# Patient Record
Sex: FEMALE | Race: WHITE | NOT HISPANIC OR LATINO
[De-identification: names, ages, dates, MRNs, and addresses within clinical notes are randomized per-mention and may not be internally consistent; named-entity substitution may affect disease eponyms.]

---

## 2018-06-18 ENCOUNTER — APPOINTMENT (OUTPATIENT)
Dept: HEMATOLOGY ONCOLOGY | Facility: CLINIC | Age: 73
End: 2018-06-18

## 2018-06-18 ENCOUNTER — LABORATORY RESULT (OUTPATIENT)
Age: 73
End: 2018-06-18

## 2018-06-18 VITALS
HEIGHT: 66 IN | BODY MASS INDEX: 27.32 KG/M2 | RESPIRATION RATE: 15 BRPM | SYSTOLIC BLOOD PRESSURE: 153 MMHG | WEIGHT: 170 LBS | TEMPERATURE: 97.8 F | DIASTOLIC BLOOD PRESSURE: 79 MMHG | HEART RATE: 76 BPM

## 2018-06-18 DIAGNOSIS — Z86.39 PERSONAL HISTORY OF OTHER ENDOCRINE, NUTRITIONAL AND METABOLIC DISEASE: ICD-10-CM

## 2018-06-18 LAB
HCT VFR BLD CALC: 35.7 %
HGB BLD-MCNC: 12.2 G/DL
MCHC RBC-ENTMCNC: 29.5 PG
MCHC RBC-ENTMCNC: 34.2 G/DL
MCV RBC AUTO: 86.4 FL
PLATELET # BLD AUTO: 21 K/UL
PMV BLD: 12.4 FL
RBC # BLD: 4.13 M/UL
RBC # FLD: 12.6 %
WBC # FLD AUTO: 8.95 K/UL

## 2018-06-19 ENCOUNTER — RX RENEWAL (OUTPATIENT)
Age: 73
End: 2018-06-19

## 2018-06-20 LAB
ALBUMIN MFR SERPL ELPH: 66 %
ALBUMIN SERPL ELPH-MCNC: 4.7 G/DL
ALBUMIN SERPL-MCNC: 4.6 G/DL
ALBUMIN/GLOB SERPL: 1.9 RATIO
ALP BLD-CCNC: 77 U/L
ALPHA1 GLOB MFR SERPL ELPH: 3.2 %
ALPHA1 GLOB SERPL ELPH-MCNC: 0.2 G/DL
ALPHA2 GLOB MFR SERPL ELPH: 9.6 %
ALPHA2 GLOB SERPL ELPH-MCNC: 0.7 G/DL
ALT SERPL-CCNC: 12 U/L
ANION GAP SERPL CALC-SCNC: 15 MMOL/L
AST SERPL-CCNC: 18 U/L
B-GLOBULIN MFR SERPL ELPH: 9.2 %
B-GLOBULIN SERPL ELPH-MCNC: 0.6 G/DL
BILIRUB SERPL-MCNC: 0.6 MG/DL
BUN SERPL-MCNC: 24 MG/DL
CALCIUM SERPL-MCNC: 9.3 MG/DL
CARDIOLIPIN AB SER IA-ACNC: NEGATIVE
CHLORIDE SERPL-SCNC: 104 MMOL/L
CO2 SERPL-SCNC: 23 MMOL/L
CREAT SERPL-MCNC: 1.1 MG/DL
DEPRECATED KAPPA LC FREE/LAMBDA SER: 0.99 RATIO
GAMMA GLOB FLD ELPH-MCNC: 0.8 G/DL
GAMMA GLOB MFR SERPL ELPH: 12 %
GLUCOSE SERPL-MCNC: 112 MG/DL
HIV1+2 AB SPEC QL IA.RAPID: NONREACTIVE
INTERPRETATION SERPL IEP-IMP: NORMAL
KAPPA LC CSF-MCNC: 1.5 MG/DL
KAPPA LC SERPL-MCNC: 1.48 MG/DL
LDH SERPL-CCNC: 209
POTASSIUM SERPL-SCNC: 4 MMOL/L
PROT SERPL-MCNC: 7 G/DL
PROT SERPL-MCNC: 7 G/DL
SODIUM SERPL-SCNC: 142 MMOL/L
VIT B12 SERPL-MCNC: 276 PG/ML

## 2018-06-26 LAB
ANA SER IF-ACNC: NEGATIVE
PS IGA SER QL: <20 U/ML
PS IGG SER QL: <10 U/ML
PS IGM SER QL: <25 U/ML

## 2018-06-28 ENCOUNTER — APPOINTMENT (OUTPATIENT)
Dept: HEMATOLOGY ONCOLOGY | Facility: CLINIC | Age: 73
End: 2018-06-28

## 2018-06-28 ENCOUNTER — APPOINTMENT (OUTPATIENT)
Dept: INFUSION THERAPY | Facility: CLINIC | Age: 73
End: 2018-06-28

## 2018-06-28 ENCOUNTER — LABORATORY RESULT (OUTPATIENT)
Age: 73
End: 2018-06-28

## 2018-06-28 LAB
HCT VFR BLD CALC: 36.1 %
HGB BLD-MCNC: 12.7 G/DL
MCHC RBC-ENTMCNC: 30.2 PG
MCHC RBC-ENTMCNC: 35.2 G/DL
MCV RBC AUTO: 86 FL
PLATELET # BLD AUTO: 9 K/UL
PMV BLD: 12.3 FL
RBC # BLD: 4.2 M/UL
RBC # FLD: 12.5 %
WBC # FLD AUTO: 6.41 K/UL

## 2018-06-28 RX ORDER — DEXAMETHASONE 0.5 MG/5ML
20 ELIXIR ORAL ONCE
Qty: 0 | Refills: 0 | Status: DISCONTINUED | OUTPATIENT
Start: 2018-06-28 | End: 2018-06-28

## 2018-06-28 RX ORDER — IMMUNE GLOBULIN,GAMMA(IGG) 5 %
70 VIAL (ML) INTRAVENOUS ONCE
Qty: 0 | Refills: 0 | Status: COMPLETED | OUTPATIENT
Start: 2018-06-28 | End: 2018-06-28

## 2018-06-28 RX ORDER — DEXAMETHASONE 0.5 MG/5ML
20 ELIXIR ORAL ONCE
Qty: 0 | Refills: 0 | Status: COMPLETED | OUTPATIENT
Start: 2018-06-28 | End: 2018-06-28

## 2018-06-28 RX ADMIN — Medication 116.67 GRAM(S): at 12:31

## 2018-06-28 RX ADMIN — Medication 20 MILLIGRAM(S): at 17:01

## 2018-06-29 ENCOUNTER — APPOINTMENT (OUTPATIENT)
Dept: INFUSION THERAPY | Facility: CLINIC | Age: 73
End: 2018-06-29

## 2018-06-29 ENCOUNTER — LABORATORY RESULT (OUTPATIENT)
Age: 73
End: 2018-06-29

## 2018-06-29 RX ORDER — DEXAMETHASONE 4 MG/1
4 TABLET ORAL DAILY
Qty: 20 | Refills: 0 | Status: ACTIVE | COMMUNITY
Start: 2018-06-18 | End: 1900-01-01

## 2018-06-29 RX ORDER — DEXAMETHASONE 0.5 MG/5ML
20 ELIXIR ORAL ONCE
Qty: 0 | Refills: 0 | Status: COMPLETED | OUTPATIENT
Start: 2018-06-29 | End: 2018-06-29

## 2018-06-29 RX ORDER — IMMUNE GLOBULIN,GAMMA(IGG) 5 %
70 VIAL (ML) INTRAVENOUS ONCE
Qty: 0 | Refills: 0 | Status: COMPLETED | OUTPATIENT
Start: 2018-06-29 | End: 2018-06-29

## 2018-06-29 RX ADMIN — Medication 116.67 GRAM(S): at 10:08

## 2018-06-29 RX ADMIN — Medication 20 MILLIGRAM(S): at 09:21

## 2018-06-30 ENCOUNTER — OTHER (OUTPATIENT)
Age: 73
End: 2018-06-30

## 2018-06-30 LAB
HCT VFR BLD CALC: 32.5 %
HGB BLD-MCNC: 11.4 G/DL
MCHC RBC-ENTMCNC: 29.8 PG
MCHC RBC-ENTMCNC: 35.1 G/DL
MCV RBC AUTO: 85.1 FL
PLATELET # BLD AUTO: 37 K/UL
PMV BLD: 11.5 FL
RBC # BLD: 3.82 M/UL
RBC # FLD: 12.7 %
WBC # FLD AUTO: 13.81 K/UL

## 2018-07-03 ENCOUNTER — APPOINTMENT (OUTPATIENT)
Dept: HEMATOLOGY ONCOLOGY | Facility: CLINIC | Age: 73
End: 2018-07-03

## 2018-07-03 ENCOUNTER — LABORATORY RESULT (OUTPATIENT)
Age: 73
End: 2018-07-03

## 2018-07-06 ENCOUNTER — LABORATORY RESULT (OUTPATIENT)
Age: 73
End: 2018-07-06

## 2018-07-06 ENCOUNTER — APPOINTMENT (OUTPATIENT)
Dept: HEMATOLOGY ONCOLOGY | Facility: CLINIC | Age: 73
End: 2018-07-06

## 2018-07-08 LAB
HCT VFR BLD CALC: 35.7 %
HCT VFR BLD CALC: 40.3 %
HGB BLD-MCNC: 12.3 G/DL
HGB BLD-MCNC: 13.9 G/DL
MCHC RBC-ENTMCNC: 29.9 PG
MCHC RBC-ENTMCNC: 30.5 PG
MCHC RBC-ENTMCNC: 34.5 G/DL
MCHC RBC-ENTMCNC: 34.5 G/DL
MCV RBC AUTO: 86.7 FL
MCV RBC AUTO: 88.6 FL
PLATELET # BLD AUTO: 170 K/UL
PLATELET # BLD AUTO: 224 K/UL
PMV BLD: 9.2 FL
PMV BLD: 9.5 FL
RBC # BLD: 4.12 M/UL
RBC # BLD: 4.55 M/UL
RBC # FLD: 13 %
RBC # FLD: 13.2 %
WBC # FLD AUTO: 11.77 K/UL
WBC # FLD AUTO: 9.94 K/UL

## 2018-07-12 ENCOUNTER — APPOINTMENT (OUTPATIENT)
Dept: HEMATOLOGY ONCOLOGY | Facility: CLINIC | Age: 73
End: 2018-07-12

## 2018-07-12 ENCOUNTER — LABORATORY RESULT (OUTPATIENT)
Age: 73
End: 2018-07-12

## 2018-07-12 ENCOUNTER — OTHER (OUTPATIENT)
Age: 73
End: 2018-07-12

## 2018-07-12 VITALS
RESPIRATION RATE: 15 BRPM | DIASTOLIC BLOOD PRESSURE: 85 MMHG | BODY MASS INDEX: 27.32 KG/M2 | HEIGHT: 66 IN | TEMPERATURE: 97.4 F | WEIGHT: 170 LBS | SYSTOLIC BLOOD PRESSURE: 150 MMHG | HEART RATE: 75 BPM

## 2018-07-13 LAB
HCT VFR BLD CALC: 36.9 %
HGB BLD-MCNC: 12.6 G/DL
MCHC RBC-ENTMCNC: 30.1 PG
MCHC RBC-ENTMCNC: 34.1 G/DL
MCV RBC AUTO: 88.1 FL
PLATELET # BLD AUTO: 65 K/UL
PMV BLD: 10.8 FL
RBC # BLD: 4.19 M/UL
RBC # FLD: 13.2 %
WBC # FLD AUTO: 9.11 K/UL

## 2018-07-19 ENCOUNTER — LABORATORY RESULT (OUTPATIENT)
Age: 73
End: 2018-07-19

## 2018-07-19 ENCOUNTER — APPOINTMENT (OUTPATIENT)
Dept: HEMATOLOGY ONCOLOGY | Facility: CLINIC | Age: 73
End: 2018-07-19

## 2018-07-20 LAB
ANION GAP SERPL CALC-SCNC: 18 MMOL/L
BUN SERPL-MCNC: 23 MG/DL
CALCIUM SERPL-MCNC: 9.1 MG/DL
CHLORIDE SERPL-SCNC: 103 MMOL/L
CO2 SERPL-SCNC: 20 MMOL/L
CREAT SERPL-MCNC: 1 MG/DL
GLUCOSE SERPL-MCNC: 95 MG/DL
HCT VFR BLD CALC: 35.5 %
HGB BLD-MCNC: 12.3 G/DL
MCHC RBC-ENTMCNC: 30.3 PG
MCHC RBC-ENTMCNC: 34.6 G/DL
MCV RBC AUTO: 87.4 FL
PLATELET # BLD AUTO: 133 K/UL
PMV BLD: 9.4 FL
POTASSIUM SERPL-SCNC: 4 MMOL/L
RBC # BLD: 4.06 M/UL
RBC # FLD: 13.1 %
SODIUM SERPL-SCNC: 141 MMOL/L
WBC # FLD AUTO: 6.97 K/UL

## 2018-07-26 ENCOUNTER — APPOINTMENT (OUTPATIENT)
Dept: HEMATOLOGY ONCOLOGY | Facility: CLINIC | Age: 73
End: 2018-07-26

## 2018-07-26 ENCOUNTER — LABORATORY RESULT (OUTPATIENT)
Age: 73
End: 2018-07-26

## 2018-07-27 LAB
HCT VFR BLD CALC: 37.3 %
HGB BLD-MCNC: 12.9 G/DL
MCHC RBC-ENTMCNC: 30.6 PG
MCHC RBC-ENTMCNC: 34.6 G/DL
MCV RBC AUTO: 88.6 FL
PLATELET # BLD AUTO: 147 K/UL
PMV BLD: 9.1 FL
RBC # BLD: 4.21 M/UL
RBC # FLD: 13.4 %
WBC # FLD AUTO: 8.2 K/UL

## 2018-08-03 ENCOUNTER — LABORATORY RESULT (OUTPATIENT)
Age: 73
End: 2018-08-03

## 2018-08-03 ENCOUNTER — APPOINTMENT (OUTPATIENT)
Dept: HEMATOLOGY ONCOLOGY | Facility: CLINIC | Age: 73
End: 2018-08-03

## 2018-08-06 ENCOUNTER — OTHER (OUTPATIENT)
Age: 73
End: 2018-08-06

## 2018-08-06 LAB
HCT VFR BLD CALC: 40.2 %
HGB BLD-MCNC: 13.5 G/DL
MCHC RBC-ENTMCNC: 30.3 PG
MCHC RBC-ENTMCNC: 33.6 G/DL
MCV RBC AUTO: 90.1 FL
PLATELET # BLD AUTO: 93 K/UL
PMV BLD: 10 FL
RBC # BLD: 4.46 M/UL
RBC # FLD: 13.2 %
WBC # FLD AUTO: 9.31 K/UL

## 2018-08-09 ENCOUNTER — APPOINTMENT (OUTPATIENT)
Dept: HEMATOLOGY ONCOLOGY | Facility: CLINIC | Age: 73
End: 2018-08-09

## 2018-08-09 ENCOUNTER — LABORATORY RESULT (OUTPATIENT)
Age: 73
End: 2018-08-09

## 2018-08-10 LAB
ANION GAP SERPL CALC-SCNC: 21 MMOL/L
BUN SERPL-MCNC: 23 MG/DL
CALCIUM SERPL-MCNC: 9.5 MG/DL
CHLORIDE SERPL-SCNC: 102 MMOL/L
CO2 SERPL-SCNC: 19 MMOL/L
CREAT SERPL-MCNC: 1.2 MG/DL
GLUCOSE SERPL-MCNC: 91 MG/DL
HCT VFR BLD CALC: 39.4 %
HGB BLD-MCNC: 13.5 G/DL
MCHC RBC-ENTMCNC: 30.5 PG
MCHC RBC-ENTMCNC: 34.3 G/DL
MCV RBC AUTO: 89.1 FL
PLATELET # BLD AUTO: 127 K/UL
PMV BLD: 9.3 FL
POTASSIUM SERPL-SCNC: 3.8 MMOL/L
RBC # BLD: 4.42 M/UL
RBC # FLD: 13.3 %
SODIUM SERPL-SCNC: 142 MMOL/L
WBC # FLD AUTO: 10.59 K/UL

## 2018-08-16 ENCOUNTER — RECORD ABSTRACTING (OUTPATIENT)
Age: 73
End: 2018-08-16

## 2018-08-16 DIAGNOSIS — Z98.890 OTHER SPECIFIED POSTPROCEDURAL STATES: ICD-10-CM

## 2018-08-16 DIAGNOSIS — Z87.19 PERSONAL HISTORY OF OTHER DISEASES OF THE DIGESTIVE SYSTEM: ICD-10-CM

## 2018-08-16 DIAGNOSIS — Z87.898 PERSONAL HISTORY OF OTHER SPECIFIED CONDITIONS: ICD-10-CM

## 2018-08-16 RX ORDER — LEVOTHYROXINE SODIUM 100 UG/1
100 TABLET ORAL
Refills: 0 | Status: ACTIVE | COMMUNITY

## 2018-08-20 ENCOUNTER — APPOINTMENT (OUTPATIENT)
Dept: OBGYN | Facility: CLINIC | Age: 73
End: 2018-08-20

## 2018-08-20 VITALS
BODY MASS INDEX: 25.88 KG/M2 | SYSTOLIC BLOOD PRESSURE: 118 MMHG | DIASTOLIC BLOOD PRESSURE: 70 MMHG | HEIGHT: 66 IN | WEIGHT: 161 LBS

## 2018-08-24 ENCOUNTER — LABORATORY RESULT (OUTPATIENT)
Age: 73
End: 2018-08-24

## 2018-08-24 ENCOUNTER — APPOINTMENT (OUTPATIENT)
Dept: HEMATOLOGY ONCOLOGY | Facility: CLINIC | Age: 73
End: 2018-08-24

## 2018-08-24 LAB
HCT VFR BLD CALC: 37.7 %
HGB BLD-MCNC: 12.9 G/DL
MCHC RBC-ENTMCNC: 30.9 PG
MCHC RBC-ENTMCNC: 34.2 G/DL
MCV RBC AUTO: 90.4 FL
PLATELET # BLD AUTO: 115 K/UL
PMV BLD: 9.4 FL
RBC # BLD: 4.17 M/UL
RBC # FLD: 13.3 %
WBC # FLD AUTO: 7.77 K/UL

## 2018-09-06 ENCOUNTER — LABORATORY RESULT (OUTPATIENT)
Age: 73
End: 2018-09-06

## 2018-09-06 ENCOUNTER — APPOINTMENT (OUTPATIENT)
Dept: HEMATOLOGY ONCOLOGY | Facility: CLINIC | Age: 73
End: 2018-09-06

## 2018-09-06 VITALS
BODY MASS INDEX: 28.28 KG/M2 | TEMPERATURE: 97.1 F | HEART RATE: 75 BPM | WEIGHT: 176 LBS | RESPIRATION RATE: 15 BRPM | HEIGHT: 66 IN | DIASTOLIC BLOOD PRESSURE: 73 MMHG | SYSTOLIC BLOOD PRESSURE: 136 MMHG

## 2018-09-07 LAB
HCT VFR BLD CALC: 37.7 %
HGB BLD-MCNC: 12.9 G/DL
MCHC RBC-ENTMCNC: 30.5 PG
MCHC RBC-ENTMCNC: 34.2 G/DL
MCV RBC AUTO: 89.1 FL
PLATELET # BLD AUTO: 110 K/UL
PMV BLD: 9.5 FL
RBC # BLD: 4.23 M/UL
RBC # FLD: 13.1 %
WBC # FLD AUTO: 9.32 K/UL

## 2018-09-21 ENCOUNTER — LABORATORY RESULT (OUTPATIENT)
Age: 73
End: 2018-09-21

## 2018-09-21 ENCOUNTER — APPOINTMENT (OUTPATIENT)
Dept: HEMATOLOGY ONCOLOGY | Facility: CLINIC | Age: 73
End: 2018-09-21

## 2018-09-21 LAB
ANION GAP SERPL CALC-SCNC: 12 MMOL/L
BUN SERPL-MCNC: 21 MG/DL
CALCIUM SERPL-MCNC: 9.6 MG/DL
CHLORIDE SERPL-SCNC: 102 MMOL/L
CO2 SERPL-SCNC: 26 MMOL/L
CREAT SERPL-MCNC: 1 MG/DL
GLUCOSE SERPL-MCNC: 84 MG/DL
HCT VFR BLD CALC: 38.1 %
HGB BLD-MCNC: 12.8 G/DL
MCHC RBC-ENTMCNC: 30.4 PG
MCHC RBC-ENTMCNC: 33.6 G/DL
MCV RBC AUTO: 90.5 FL
PLATELET # BLD AUTO: 98 K/UL
PMV BLD: 9.3 FL
POTASSIUM SERPL-SCNC: 4.1 MMOL/L
RBC # BLD: 4.21 M/UL
RBC # FLD: 12.8 %
SODIUM SERPL-SCNC: 140 MMOL/L
WBC # FLD AUTO: 8.96 K/UL

## 2018-10-05 ENCOUNTER — LABORATORY RESULT (OUTPATIENT)
Age: 73
End: 2018-10-05

## 2018-10-05 ENCOUNTER — APPOINTMENT (OUTPATIENT)
Dept: HEMATOLOGY ONCOLOGY | Facility: CLINIC | Age: 73
End: 2018-10-05

## 2018-10-05 LAB
HCT VFR BLD CALC: 36.8 %
HGB BLD-MCNC: 12.5 G/DL
MCHC RBC-ENTMCNC: 30.7 PG
MCHC RBC-ENTMCNC: 34 G/DL
MCV RBC AUTO: 90.4 FL
PLATELET # BLD AUTO: 130 K/UL
PMV BLD: 10.2 FL
RBC # BLD: 4.07 M/UL
RBC # FLD: 12.6 %
WBC # FLD AUTO: 7.43 K/UL

## 2018-10-09 ENCOUNTER — RX RENEWAL (OUTPATIENT)
Age: 73
End: 2018-10-09

## 2018-10-09 RX ORDER — LANSOPRAZOLE 30 MG/1
30 CAPSULE, DELAYED RELEASE ORAL DAILY
Qty: 30 | Refills: 1 | Status: ACTIVE | COMMUNITY
Start: 2018-06-19 | End: 1900-01-01

## 2018-10-10 ENCOUNTER — OTHER (OUTPATIENT)
Age: 73
End: 2018-10-10

## 2018-11-08 ENCOUNTER — LABORATORY RESULT (OUTPATIENT)
Age: 73
End: 2018-11-08

## 2018-11-08 ENCOUNTER — APPOINTMENT (OUTPATIENT)
Dept: HEMATOLOGY ONCOLOGY | Facility: CLINIC | Age: 73
End: 2018-11-08

## 2018-11-10 LAB
HCT VFR BLD CALC: 36.7 %
HGB BLD-MCNC: 12.7 G/DL
MCHC RBC-ENTMCNC: 30.5 PG
MCHC RBC-ENTMCNC: 34.6 G/DL
MCV RBC AUTO: 88.2 FL
PLATELET # BLD AUTO: 110 K/UL
PMV BLD: 9.9 FL
RBC # BLD: 4.16 M/UL
RBC # FLD: 11.6 %
WBC # FLD AUTO: 6.68 K/UL

## 2018-12-06 ENCOUNTER — LABORATORY RESULT (OUTPATIENT)
Age: 73
End: 2018-12-06

## 2018-12-06 ENCOUNTER — OUTPATIENT (OUTPATIENT)
Dept: OUTPATIENT SERVICES | Facility: HOSPITAL | Age: 73
LOS: 1 days | Discharge: HOME | End: 2018-12-06

## 2018-12-06 ENCOUNTER — APPOINTMENT (OUTPATIENT)
Dept: HEMATOLOGY ONCOLOGY | Facility: CLINIC | Age: 73
End: 2018-12-06

## 2018-12-06 VITALS
RESPIRATION RATE: 15 BRPM | TEMPERATURE: 97.1 F | WEIGHT: 174 LBS | BODY MASS INDEX: 27.97 KG/M2 | HEART RATE: 76 BPM | SYSTOLIC BLOOD PRESSURE: 114 MMHG | DIASTOLIC BLOOD PRESSURE: 83 MMHG | HEIGHT: 66 IN

## 2018-12-06 DIAGNOSIS — D69.6 THROMBOCYTOPENIA, UNSPECIFIED: ICD-10-CM

## 2018-12-06 NOTE — HISTORY OF PRESENT ILLNESS
[de-identified] : 72 year old female referred by Dr Farzad Gray for thrombocytopenia . She was seen in this office 8 years ago for mild thrombocytopenia , work up is unavailable , she was lost for follow up and had uncomplicated gallbladder surgery in 2012, carpal tunnel surgery in November 2017 . platelet count was 174 in JUNE 2017 and 39 in November 2017 prior to carpal tunnel surgery . Most recent platelet count was 19 on June 8 . She has noted mild easy bruising on her chest and back , no mucosal bleeding . no new meds ASA or vitamins, Last colonoscopy 4 years ago for polyps, mammogram in August 2017 , she reports intentional weight loss of 30 lbs with diet . no fever , night sweats, rash , joint pains, he reports slight fatigue recently . \par  [de-identified] : 07/12/2018 : Patient returns for follow up after 2 courses of pulse decadron 20mg X 4. She complained of insomnia on the medication , she had no response to the first cycle and required Iv IG with good response. She denies any abnormal bleeding at this time. \par 09/06/2018 : patient returns for follow up , she is on prednisone 10mg daily and is frustrated with her inability to lose weight with diet and exercise. She is otherwise asymptomatic. \par \par 12/06/2018 Patient returns for follow up , she is currently on prednisone 5 mg , her weight is back to baseline , she continues on dyazide every other day for edema, she denies any abnormal bleeding , .

## 2018-12-06 NOTE — PHYSICAL EXAM
[Normal] : no JVD, no calf tenderness, venous stasis changes, varices [de-identified] : no edema.  [de-identified] : no petechiae or echymosis.

## 2018-12-06 NOTE — ASSESSMENT
[FreeTextEntry1] : 72 year old female with history of hypothyroidism  ( Hashimoto's ?) on replacement , Chronic ITP with no response to pulse decadron ( 20mg ) , Good transient response to IvIG , today platelet is 100 on prednisone 5mg , will repeat in one month and consider to continue slow taper ( to every other day )  . stop PPI and continue on diuretic as needed.

## 2018-12-07 LAB
HCT VFR BLD CALC: 38.9 %
HGB BLD-MCNC: 13.6 G/DL
MCHC RBC-ENTMCNC: 30.4 PG
MCHC RBC-ENTMCNC: 35 G/DL
MCV RBC AUTO: 87 FL
PLATELET # BLD AUTO: 100 K/UL
PMV BLD: 9.9 FL
RBC # BLD: 4.47 M/UL
RBC # FLD: 11.8 %
WBC # FLD AUTO: 9.03 K/UL

## 2019-01-02 ENCOUNTER — RX RENEWAL (OUTPATIENT)
Age: 74
End: 2019-01-02

## 2019-01-03 ENCOUNTER — OUTPATIENT (OUTPATIENT)
Dept: OUTPATIENT SERVICES | Facility: HOSPITAL | Age: 74
LOS: 1 days | Discharge: HOME | End: 2019-01-03

## 2019-01-03 ENCOUNTER — APPOINTMENT (OUTPATIENT)
Dept: HEMATOLOGY ONCOLOGY | Facility: CLINIC | Age: 74
End: 2019-01-03

## 2019-01-03 ENCOUNTER — LABORATORY RESULT (OUTPATIENT)
Age: 74
End: 2019-01-03

## 2019-01-03 VITALS
SYSTOLIC BLOOD PRESSURE: 124 MMHG | RESPIRATION RATE: 15 BRPM | HEIGHT: 66 IN | HEART RATE: 78 BPM | BODY MASS INDEX: 27.32 KG/M2 | DIASTOLIC BLOOD PRESSURE: 89 MMHG | TEMPERATURE: 97.6 F | WEIGHT: 170 LBS

## 2019-01-03 LAB
HCT VFR BLD CALC: 37.6 %
HGB BLD-MCNC: 13.2 G/DL
MCHC RBC-ENTMCNC: 30.8 PG
MCHC RBC-ENTMCNC: 35.1 G/DL
MCV RBC AUTO: 87.6 FL
PLATELET # BLD AUTO: 65 K/UL
PMV BLD: 10.7 FL
RBC # BLD: 4.29 M/UL
RBC # FLD: 12.2 %
WBC # FLD AUTO: 10.04 K/UL

## 2019-01-03 NOTE — ASSESSMENT
[FreeTextEntry1] : 72 year old female with history of hypothyroidism  ( Hashimoto's ?) on replacement , Chronic ITP with no response to pulse decadron ( 20mg ) , Good transient response to IvIG , today platelet is 64, down from 100  on prednisone 5mg every other day , will increase to 10mg daily , repeat CBC in one week.  . .

## 2019-01-03 NOTE — PHYSICAL EXAM
[Normal] : no JVD, no calf tenderness, venous stasis changes, varices [de-identified] : no edema.  [de-identified] : no petechiae or echymosis.

## 2019-01-03 NOTE — HISTORY OF PRESENT ILLNESS
[de-identified] : 72 year old female referred by Dr Farzad Gray for thrombocytopenia . She was seen in this office 8 years ago for mild thrombocytopenia , work up is unavailable , she was lost for follow up and had uncomplicated gallbladder surgery in 2012, carpal tunnel surgery in November 2017 . platelet count was 174 in JUNE 2017 and 39 in November 2017 prior to carpal tunnel surgery . Most recent platelet count was 19 on June 8 . She has noted mild easy bruising on her chest and back , no mucosal bleeding . no new meds ASA or vitamins, Last colonoscopy 4 years ago for polyps, mammogram in August 2017 , she reports intentional weight loss of 30 lbs with diet . no fever , night sweats, rash , joint pains, he reports slight fatigue recently . \par  [de-identified] : 07/12/2018 : Patient returns for follow up after 2 courses of pulse decadron 20mg X 4. She complained of insomnia on the medication , she had no response to the first cycle and required Iv IG with good response. She denies any abnormal bleeding at this time. \par 09/06/2018 : patient returns for follow up , she is on prednisone 10mg daily and is frustrated with her inability to lose weight with diet and exercise. She is otherwise asymptomatic. \par \par 12/06/2018 Patient returns for follow up , she is currently on prednisone 5 mg , her weight is back to baseline , she continues on dyazide every other day for edema, she denies any abnormal bleeding , .\par \par 01/03/2019 Patient returns for follow up , she is on prednisone 5mg every other day , recent blood work showed K of 3.3 . she continues on dyazide every other day for edema . SHe is back to her baseline weight . She had three nosebleeds last month .

## 2019-01-04 DIAGNOSIS — D69.3 IMMUNE THROMBOCYTOPENIC PURPURA: ICD-10-CM

## 2019-05-30 ENCOUNTER — APPOINTMENT (OUTPATIENT)
Dept: HEMATOLOGY ONCOLOGY | Facility: CLINIC | Age: 74
End: 2019-05-30

## 2019-05-30 ENCOUNTER — OTHER (OUTPATIENT)
Age: 74
End: 2019-05-30

## 2019-05-30 ENCOUNTER — LABORATORY RESULT (OUTPATIENT)
Age: 74
End: 2019-05-30

## 2019-05-30 VITALS
TEMPERATURE: 97.1 F | WEIGHT: 180 LBS | HEIGHT: 66 IN | HEART RATE: 86 BPM | DIASTOLIC BLOOD PRESSURE: 80 MMHG | BODY MASS INDEX: 28.93 KG/M2 | RESPIRATION RATE: 15 BRPM | SYSTOLIC BLOOD PRESSURE: 140 MMHG

## 2019-05-30 NOTE — HISTORY OF PRESENT ILLNESS
[de-identified] : 72 year old female referred by Dr Farzad Gray for thrombocytopenia . She was seen in this office 8 years ago for mild thrombocytopenia , work up is unavailable , she was lost for follow up and had uncomplicated gallbladder surgery in 2012, carpal tunnel surgery in November 2017 . platelet count was 174 in JUNE 2017 and 39 in November 2017 prior to carpal tunnel surgery . Most recent platelet count was 19 on June 8 . She has noted mild easy bruising on her chest and back , no mucosal bleeding . no new meds ASA or vitamins, Last colonoscopy 4 years ago for polyps, mammogram in August 2017 , she reports intentional weight loss of 30 lbs with diet . no fever , night sweats, rash , joint pains, he reports slight fatigue recently . \par  [de-identified] : 07/12/2018 : Patient returns for follow up after 2 courses of pulse decadron 20mg X 4. She complained of insomnia on the medication , she had no response to the first cycle and required Iv IG with good response. She denies any abnormal bleeding at this time. \par 09/06/2018 : patient returns for follow up , she is on prednisone 10mg daily and is frustrated with her inability to lose weight with diet and exercise. She is otherwise asymptomatic. \par \par 12/06/2018 Patient returns for follow up , she is currently on prednisone 5 mg , her weight is back to baseline , she continues on dyazide every other day for edema, she denies any abnormal bleeding , .\par \par 01/03/2019 Patient returns for follow up , she is on prednisone 5mg every other day , recent blood work showed K of 3.3 . she continues on dyazide every other day for edema . SHe is back to her baseline weight . She had three nosebleeds last month .\par \par 05/30/2019 Patient returns for follow up , she continues on prednisone 10mg daily and reports weight gain of 10 lbs . she denies any abnormal bleeding . She feels swollen after she stopped the diuretic and started on losartan 50mg for hypertension . She reports occasional orthostatic dizziness.

## 2019-05-30 NOTE — PHYSICAL EXAM
[Normal] : no JVD, no calf tenderness, venous stasis changes, varices [de-identified] : trace edema [de-identified] : no petechiae or echymosis.

## 2019-05-30 NOTE — ASSESSMENT
[FreeTextEntry1] : 72 year old female with history of hypothyroidism  ( Hashimoto's ?) on replacement , Chronic ITP with no response to pulse decadron ( 20mg ) , Good transient response to IvIG , today platelet is 79 , will reduce prednisone to 5 mg . \par Hypertension , well controlled , mild postural dizziness and edema. Will reduce losartan to 25 mg and add HCTZ 12.5 daily . follow up in one month .

## 2019-05-31 LAB
HCT VFR BLD CALC: 36.1 %
HGB BLD-MCNC: 12.3 G/DL
MCHC RBC-ENTMCNC: 31.1 PG
MCHC RBC-ENTMCNC: 34.1 G/DL
MCV RBC AUTO: 91.2 FL
PLATELET # BLD AUTO: 79 K/UL
PMV BLD: 10 FL
RBC # BLD: 3.96 M/UL
RBC # FLD: 12.1 %
WBC # FLD AUTO: 9.83 K/UL

## 2019-06-07 ENCOUNTER — RX RENEWAL (OUTPATIENT)
Age: 74
End: 2019-06-07

## 2019-07-01 ENCOUNTER — LABORATORY RESULT (OUTPATIENT)
Age: 74
End: 2019-07-01

## 2019-07-01 ENCOUNTER — OUTPATIENT (OUTPATIENT)
Dept: OUTPATIENT SERVICES | Facility: HOSPITAL | Age: 74
LOS: 1 days | Discharge: HOME | End: 2019-07-01

## 2019-07-01 ENCOUNTER — APPOINTMENT (OUTPATIENT)
Dept: HEMATOLOGY ONCOLOGY | Facility: CLINIC | Age: 74
End: 2019-07-01
Payer: MEDICARE

## 2019-07-01 VITALS
BODY MASS INDEX: 28.93 KG/M2 | WEIGHT: 180 LBS | TEMPERATURE: 96.4 F | HEIGHT: 66 IN | HEART RATE: 86 BPM | RESPIRATION RATE: 14 BRPM | SYSTOLIC BLOOD PRESSURE: 130 MMHG | DIASTOLIC BLOOD PRESSURE: 61 MMHG

## 2019-07-01 DIAGNOSIS — D69.3 IMMUNE THROMBOCYTOPENIC PURPURA: ICD-10-CM

## 2019-07-01 LAB
ANION GAP SERPL CALC-SCNC: 13 MMOL/L
BUN SERPL-MCNC: 27 MG/DL
CALCIUM SERPL-MCNC: 9.7 MG/DL
CHLORIDE SERPL-SCNC: 106 MMOL/L
CO2 SERPL-SCNC: 24 MMOL/L
CREAT SERPL-MCNC: 1.1 MG/DL
GLUCOSE SERPL-MCNC: 89 MG/DL
HCT VFR BLD CALC: 36.8 %
HGB BLD-MCNC: 12.2 G/DL
MCHC RBC-ENTMCNC: 30.8 PG
MCHC RBC-ENTMCNC: 33.2 G/DL
MCV RBC AUTO: 92.9 FL
PLATELET # BLD AUTO: 88 K/UL
PMV BLD: 9.2 FL
POTASSIUM SERPL-SCNC: 4 MMOL/L
RBC # BLD: 3.96 M/UL
RBC # FLD: 12 %
SODIUM SERPL-SCNC: 143 MMOL/L
WBC # FLD AUTO: 5.18 K/UL

## 2019-07-01 PROCEDURE — 99214 OFFICE O/P EST MOD 30 MIN: CPT

## 2019-07-01 NOTE — PHYSICAL EXAM
[Normal] : no JVD, no calf tenderness, venous stasis changes, varices [Fully active, able to carry on all pre-disease performance without restriction] : Status 0 - Fully active, able to carry on all pre-disease performance without restriction [de-identified] : trace edema [de-identified] : scattered bruises on extremities, healing well

## 2019-07-01 NOTE — ASSESSMENT
[FreeTextEntry1] : 72 year old female with history of hypothyroidism  ( Hashimoto's ?) on replacement , Chronic ITP with no response to pulse decadron ( 20mg ) , Good transient response to IvIG , today platelet is 88 , will change to prednisone to 5 mg every other day alternating with 2.5 mg.\par  \par #Hypertension , well controlled , mild postural dizziness and edema, now improved. C/W losartan to 25 mg and add Triamterene-HCTZ 12.5 daily . \par \par #HCP: She will f/u with gyn who orders screening mammogram. \par \par #RTC in 2 months

## 2019-07-01 NOTE — HISTORY OF PRESENT ILLNESS
[de-identified] : 72 year old female referred by Dr Farzad Gray for thrombocytopenia . She was seen in this office 8 years ago for mild thrombocytopenia , work up is unavailable , she was lost for follow up and had uncomplicated gallbladder surgery in 2012, carpal tunnel surgery in November 2017 . platelet count was 174 in JUNE 2017 and 39 in November 2017 prior to carpal tunnel surgery . Most recent platelet count was 19 on June 8 . She has noted mild easy bruising on her chest and back , no mucosal bleeding . no new meds ASA or vitamins, Last colonoscopy 4 years ago for polyps, mammogram in August 2017 , she reports intentional weight loss of 30 lbs with diet . no fever , night sweats, rash , joint pains, he reports slight fatigue recently . \par  [de-identified] : 07/12/2018 : Patient returns for follow up after 2 courses of pulse decadron 20mg X 4. She complained of insomnia on the medication , she had no response to the first cycle and required Iv IG with good response. She denies any abnormal bleeding at this time. \par 09/06/2018 : patient returns for follow up , she is on prednisone 10mg daily and is frustrated with her inability to lose weight with diet and exercise. She is otherwise asymptomatic. \par \par 12/06/2018 Patient returns for follow up , she is currently on prednisone 5 mg , her weight is back to baseline , she continues on dyazide every other day for edema, she denies any abnormal bleeding , .\par \par 01/03/2019 Patient returns for follow up , she is on prednisone 5mg every other day , recent blood work showed K of 3.3 . she continues on dyazide every other day for edema . SHe is back to her baseline weight . She had three nosebleeds last month .\par \par 05/30/2019 Patient returns for follow up , she continues on prednisone 10mg daily and reports weight gain of 10 lbs . she denies any abnormal bleeding . She feels swollen after she stopped the diuretic and started on losartan 50mg for hypertension . She reports occasional orthostatic dizziness. \par \par 7/1/19: Pt returns for a follow-up visit. She only c/o a few small bruises on her extremities that are in different stages of healing. She denies having any trauma. No petechiae, intraoral bleeding or bleeding from any other site. Her platelet count today is 88. Rest of the CBC is wnl. She is presently on prednisone 5mg PO daily.

## 2019-07-05 DIAGNOSIS — D69.6 THROMBOCYTOPENIA, UNSPECIFIED: ICD-10-CM

## 2019-07-24 ENCOUNTER — OTHER (OUTPATIENT)
Age: 74
End: 2019-07-24

## 2019-08-29 ENCOUNTER — RX RENEWAL (OUTPATIENT)
Age: 74
End: 2019-08-29

## 2019-09-10 ENCOUNTER — LABORATORY RESULT (OUTPATIENT)
Age: 74
End: 2019-09-10

## 2019-09-10 ENCOUNTER — APPOINTMENT (OUTPATIENT)
Dept: HEMATOLOGY ONCOLOGY | Facility: CLINIC | Age: 74
End: 2019-09-10
Payer: MEDICARE

## 2019-09-10 VITALS
TEMPERATURE: 97.4 F | HEIGHT: 67 IN | WEIGHT: 174 LBS | BODY MASS INDEX: 27.31 KG/M2 | HEART RATE: 77 BPM | DIASTOLIC BLOOD PRESSURE: 82 MMHG | SYSTOLIC BLOOD PRESSURE: 190 MMHG | RESPIRATION RATE: 14 BRPM

## 2019-09-10 VITALS — DIASTOLIC BLOOD PRESSURE: 82 MMHG | SYSTOLIC BLOOD PRESSURE: 150 MMHG

## 2019-09-10 DIAGNOSIS — Z00.00 ENCOUNTER FOR GENERAL ADULT MEDICAL EXAMINATION W/OUT ABNORMAL FINDINGS: ICD-10-CM

## 2019-09-10 PROCEDURE — 99214 OFFICE O/P EST MOD 30 MIN: CPT

## 2019-09-10 NOTE — PHYSICAL EXAM
[Normal] : no JVD, no calf tenderness, venous stasis changes, varices [de-identified] : trace edema [de-identified] : multiple bruises on lower extremities and large echymosis on right shoulder .

## 2019-09-10 NOTE — PHYSICAL EXAM
[Normal] : no JVD, no calf tenderness, venous stasis changes, varices [de-identified] : trace edema [de-identified] : multiple bruises on lower extremities and large echymosis on right shoulder .

## 2019-09-11 LAB
HCT VFR BLD CALC: 35.3 %
HGB BLD-MCNC: 12.4 G/DL
MCHC RBC-ENTMCNC: 31.4 PG
MCHC RBC-ENTMCNC: 35.1 G/DL
MCV RBC AUTO: 89.4 FL
PLATELET # BLD AUTO: 45 K/UL
PMV BLD: 11.9 FL
RBC # BLD: 3.95 M/UL
RBC # FLD: 12.1 %
WBC # FLD AUTO: 8.97 K/UL

## 2019-09-11 NOTE — HISTORY OF PRESENT ILLNESS
[de-identified] : 72 year old female referred by Dr Farzad Gray for thrombocytopenia . She was seen in this office 8 years ago for mild thrombocytopenia , work up is unavailable , she was lost for follow up and had uncomplicated gallbladder surgery in 2012, carpal tunnel surgery in November 2017 . platelet count was 174 in JUNE 2017 and 39 in November 2017 prior to carpal tunnel surgery . Most recent platelet count was 19 on June 8 . She has noted mild easy bruising on her chest and back , no mucosal bleeding . no new meds ASA or vitamins, Last colonoscopy 4 years ago for polyps, mammogram in August 2017 , she reports intentional weight loss of 30 lbs with diet . no fever , night sweats, rash , joint pains, he reports slight fatigue recently . \par  [de-identified] : 07/12/2018 : Patient returns for follow up after 2 courses of pulse decadron 20mg X 4. She complained of insomnia on the medication , she had no response to the first cycle and required Iv IG with good response. She denies any abnormal bleeding at this time. \par 09/06/2018 : patient returns for follow up , she is on prednisone 10mg daily and is frustrated with her inability to lose weight with diet and exercise. She is otherwise asymptomatic. \par \par 12/06/2018 Patient returns for follow up , she is currently on prednisone 5 mg , her weight is back to baseline , she continues on dyazide every other day for edema, she denies any abnormal bleeding , .\par \par 01/03/2019 Patient returns for follow up , she is on prednisone 5mg every other day , recent blood work showed K of 3.3 . she continues on dyazide every other day for edema . SHe is back to her baseline weight . She had three nosebleeds last month .\par \par 05/30/2019 Patient returns for follow up , she continues on prednisone 10mg daily and reports weight gain of 10 lbs . she denies any abnormal bleeding . She feels swollen after she stopped the diuretic and started on losartan 50mg for hypertension . She reports occasional orthostatic dizziness.

## 2019-09-11 NOTE — HISTORY OF PRESENT ILLNESS
[de-identified] : 72 year old female referred by Dr Farzad Gray for thrombocytopenia . She was seen in this office 8 years ago for mild thrombocytopenia , work up is unavailable , she was lost for follow up and had uncomplicated gallbladder surgery in 2012, carpal tunnel surgery in November 2017 . platelet count was 174 in JUNE 2017 and 39 in November 2017 prior to carpal tunnel surgery . Most recent platelet count was 19 on June 8 . She has noted mild easy bruising on her chest and back , no mucosal bleeding . no new meds ASA or vitamins, Last colonoscopy 4 years ago for polyps, mammogram in August 2017 , she reports intentional weight loss of 30 lbs with diet . no fever , night sweats, rash , joint pains, he reports slight fatigue recently . \par  [de-identified] : 07/12/2018 : Patient returns for follow up after 2 courses of pulse decadron 20mg X 4. She complained of insomnia on the medication , she had no response to the first cycle and required Iv IG with good response. She denies any abnormal bleeding at this time. \par 09/06/2018 : patient returns for follow up , she is on prednisone 10mg daily and is frustrated with her inability to lose weight with diet and exercise. She is otherwise asymptomatic. \par \par 12/06/2018 Patient returns for follow up , she is currently on prednisone 5 mg , her weight is back to baseline , she continues on dyazide every other day for edema, she denies any abnormal bleeding , .\par \par 01/03/2019 Patient returns for follow up , she is on prednisone 5mg every other day , recent blood work showed K of 3.3 . she continues on dyazide every other day for edema . SHe is back to her baseline weight . She had three nosebleeds last month .\par \par 05/30/2019 Patient returns for follow up , she continues on prednisone 10mg daily and reports weight gain of 10 lbs . she denies any abnormal bleeding . She feels swollen after she stopped the diuretic and started on losartan 50mg for hypertension . She reports occasional orthostatic dizziness.

## 2019-09-24 ENCOUNTER — APPOINTMENT (OUTPATIENT)
Dept: HEMATOLOGY ONCOLOGY | Facility: CLINIC | Age: 74
End: 2019-09-24
Payer: MEDICARE

## 2019-09-24 ENCOUNTER — LABORATORY RESULT (OUTPATIENT)
Age: 74
End: 2019-09-24

## 2019-09-24 VITALS
BODY MASS INDEX: 27.94 KG/M2 | RESPIRATION RATE: 16 BRPM | HEART RATE: 82 BPM | TEMPERATURE: 97.5 F | HEIGHT: 67 IN | WEIGHT: 178 LBS | DIASTOLIC BLOOD PRESSURE: 78 MMHG | SYSTOLIC BLOOD PRESSURE: 169 MMHG

## 2019-09-24 LAB
HCT VFR BLD CALC: 38 %
HGB BLD-MCNC: 13.2 G/DL
MCHC RBC-ENTMCNC: 31 PG
MCHC RBC-ENTMCNC: 34.7 G/DL
MCV RBC AUTO: 89.2 FL
PLATELET # BLD AUTO: 41 K/UL
PMV BLD: 11.4 FL
RBC # BLD: 4.26 M/UL
RBC # FLD: 12.5 %
WBC # FLD AUTO: 8.31 K/UL

## 2019-09-24 PROCEDURE — 99214 OFFICE O/P EST MOD 30 MIN: CPT

## 2019-09-25 LAB
ALBUMIN SERPL ELPH-MCNC: 4.9 G/DL
ALP BLD-CCNC: 74 U/L
ALT SERPL-CCNC: 15 U/L
ANION GAP SERPL CALC-SCNC: 14 MMOL/L
AST SERPL-CCNC: 21 U/L
BILIRUB SERPL-MCNC: 0.7 MG/DL
BUN SERPL-MCNC: 35 MG/DL
CALCIUM SERPL-MCNC: 10 MG/DL
CHLORIDE SERPL-SCNC: 105 MMOL/L
CO2 SERPL-SCNC: 23 MMOL/L
CREAT SERPL-MCNC: 1.3 MG/DL
GLUCOSE SERPL-MCNC: 93 MG/DL
HBV CORE IGG+IGM SER QL: NONREACTIVE
HBV CORE IGM SER QL: NONREACTIVE
HBV SURFACE AB SER QL: REACTIVE
HBV SURFACE AG SER QL: NONREACTIVE
HCV AB SER QL: NONREACTIVE
HCV S/CO RATIO: 0.11 S/CO
POTASSIUM SERPL-SCNC: 3.8 MMOL/L
PROT SERPL-MCNC: 7.2 G/DL
SODIUM SERPL-SCNC: 142 MMOL/L

## 2019-09-25 NOTE — ASSESSMENT
[FreeTextEntry1] : 73 year old female with history of hypothyroidism  ( Hashimoto's ?) on replacement , Chronic ITP \par Platelet : 41 , symptomatic, on 5 mg prednisone Q24H. Patient is requesting to discontinue steroids. \par \par We discussed at length alternative options , pros , cons, risk and benefits of splenectomy , immunotherapy , rituximab and thrombopoietic agents like promacta. She opted to start rituxan\par \par Plan:\par --Check Hep B and C serologies\par --She will start weekly rituxan x 4 on 10/1/19\par --C/W current doses of prednisone \par

## 2019-09-25 NOTE — HISTORY OF PRESENT ILLNESS
[de-identified] : 72 year old female referred by Dr Farzad Gray for thrombocytopenia . She was seen in this office 8 years ago for mild thrombocytopenia , work up is unavailable , she was lost for follow up and had uncomplicated gallbladder surgery in 2012, carpal tunnel surgery in November 2017 . platelet count was 174 in JUNE 2017 and 39 in November 2017 prior to carpal tunnel surgery . Most recent platelet count was 19 on June 8 . She has noted mild easy bruising on her chest and back , no mucosal bleeding . no new meds ASA or vitamins, Last colonoscopy 4 years ago for polyps, mammogram in August 2017 , she reports intentional weight loss of 30 lbs with diet . no fever , night sweats, rash , joint pains, he reports slight fatigue recently . \par  [de-identified] : 07/12/2018 : Patient returns for follow up after 2 courses of pulse decadron 20mg X 4. She complained of insomnia on the medication , she had no response to the first cycle and required Iv IG with good response. She denies any abnormal bleeding at this time. \par 09/06/2018 : patient returns for follow up , she is on prednisone 10mg daily and is frustrated with her inability to lose weight with diet and exercise. She is otherwise asymptomatic. \par \par 12/06/2018 Patient returns for follow up , she is currently on prednisone 5 mg , her weight is back to baseline , she continues on dyazide every other day for edema, she denies any abnormal bleeding , .\par \par 01/03/2019 Patient returns for follow up , she is on prednisone 5mg every other day , recent blood work showed K of 3.3 . she continues on dyazide every other day for edema . SHe is back to her baseline weight . She had three nosebleeds last month .\par \par 05/30/2019 Patient returns for follow up , she continues on prednisone 10mg daily and reports weight gain of 10 lbs . she denies any abnormal bleeding . She feels swollen after she stopped the diuretic and started on losartan 50mg for hypertension . She reports occasional orthostatic dizziness. \par \par 09/10/2019 Patient returns for follow up , she is on prednisone 5 mg alternating with 2.5 mg daily . she complains of generalized bruising including large echymosis on right shoulder , noted after colonoscopy and is possibly traumatic . platelet :45 . She has been on steroids for nearly 18 months and is " fed up " from the side effects including mood and weight fluctuations , hypertension . . \par \par 9/24/19: Pt returns for a f/u visit. She has no fresh complaints. Her previous shoulder bruise has resolved. She is currently on 5 mg PO prednisone daily. Her plts are 41,000 today.

## 2019-09-25 NOTE — PHYSICAL EXAM
[Fully active, able to carry on all pre-disease performance without restriction] : Status 0 - Fully active, able to carry on all pre-disease performance without restriction [Normal] : no JVD, no calf tenderness, venous stasis changes, varices [de-identified] : trace edema

## 2019-10-01 ENCOUNTER — LABORATORY RESULT (OUTPATIENT)
Age: 74
End: 2019-10-01

## 2019-10-01 ENCOUNTER — APPOINTMENT (OUTPATIENT)
Dept: INFUSION THERAPY | Facility: CLINIC | Age: 74
End: 2019-10-01

## 2019-10-01 LAB
HCT VFR BLD CALC: 37.8 %
HGB BLD-MCNC: 12.8 G/DL
MCHC RBC-ENTMCNC: 30.5 PG
MCHC RBC-ENTMCNC: 33.9 G/DL
MCV RBC AUTO: 90 FL
PLATELET # BLD AUTO: 43 K/UL
PMV BLD: 11.6 FL
RBC # BLD: 4.2 M/UL
RBC # FLD: 12.5 %
WBC # FLD AUTO: 7.11 K/UL

## 2019-10-01 RX ORDER — RITUXIMAB 10 MG/ML
700 INJECTION, SOLUTION INTRAVENOUS ONCE
Refills: 0 | Status: COMPLETED | OUTPATIENT
Start: 2019-10-01 | End: 2019-10-01

## 2019-10-01 RX ORDER — ACETAMINOPHEN 500 MG
650 TABLET ORAL ONCE
Refills: 0 | Status: COMPLETED | OUTPATIENT
Start: 2019-10-01 | End: 2019-10-01

## 2019-10-01 RX ORDER — DIPHENHYDRAMINE HCL 50 MG
50 CAPSULE ORAL ONCE
Refills: 0 | Status: COMPLETED | OUTPATIENT
Start: 2019-10-01 | End: 2019-10-01

## 2019-10-01 RX ORDER — DEXAMETHASONE 0.5 MG/5ML
10 ELIXIR ORAL ONCE
Refills: 0 | Status: COMPLETED | OUTPATIENT
Start: 2019-10-01 | End: 2019-10-01

## 2019-10-01 RX ADMIN — RITUXIMAB 175 MILLIGRAM(S): 10 INJECTION, SOLUTION INTRAVENOUS at 10:56

## 2019-10-01 RX ADMIN — Medication 50 MILLIGRAM(S): at 10:44

## 2019-10-01 RX ADMIN — Medication 650 MILLIGRAM(S): at 10:04

## 2019-10-01 RX ADMIN — Medication 650 MILLIGRAM(S): at 10:03

## 2019-10-01 RX ADMIN — Medication 10 MILLIGRAM(S): at 10:23

## 2019-10-01 RX ADMIN — Medication 102 MILLIGRAM(S): at 10:24

## 2019-10-01 RX ADMIN — Medication 102 MILLIGRAM(S): at 10:03

## 2019-10-08 ENCOUNTER — LABORATORY RESULT (OUTPATIENT)
Age: 74
End: 2019-10-08

## 2019-10-08 ENCOUNTER — APPOINTMENT (OUTPATIENT)
Dept: INFUSION THERAPY | Facility: CLINIC | Age: 74
End: 2019-10-08

## 2019-10-08 LAB
HCT VFR BLD CALC: 37.9 %
HGB BLD-MCNC: 12.9 G/DL
MCHC RBC-ENTMCNC: 30.6 PG
MCHC RBC-ENTMCNC: 34 G/DL
MCV RBC AUTO: 90 FL
PLATELET # BLD AUTO: 62 K/UL
PMV BLD: 10.8 FL
RBC # BLD: 4.21 M/UL
RBC # FLD: 12.4 %
WBC # FLD AUTO: 7.48 K/UL

## 2019-10-08 RX ORDER — RITUXIMAB 10 MG/ML
700 INJECTION, SOLUTION INTRAVENOUS ONCE
Refills: 0 | Status: COMPLETED | OUTPATIENT
Start: 2019-10-08 | End: 2019-10-08

## 2019-10-08 RX ORDER — ACETAMINOPHEN 500 MG
650 TABLET ORAL ONCE
Refills: 0 | Status: COMPLETED | OUTPATIENT
Start: 2019-10-08 | End: 2019-10-08

## 2019-10-08 RX ORDER — DIPHENHYDRAMINE HCL 50 MG
50 CAPSULE ORAL ONCE
Refills: 0 | Status: COMPLETED | OUTPATIENT
Start: 2019-10-08 | End: 2019-10-08

## 2019-10-08 RX ORDER — DEXAMETHASONE 0.5 MG/5ML
10 ELIXIR ORAL ONCE
Refills: 0 | Status: COMPLETED | OUTPATIENT
Start: 2019-10-08 | End: 2019-10-08

## 2019-10-08 RX ADMIN — Medication 102 MILLIGRAM(S): at 09:38

## 2019-10-08 RX ADMIN — Medication 50 MILLIGRAM(S): at 09:58

## 2019-10-08 RX ADMIN — Medication 650 MILLIGRAM(S): at 09:18

## 2019-10-08 RX ADMIN — Medication 102 MILLIGRAM(S): at 09:17

## 2019-10-08 RX ADMIN — RITUXIMAB 175 MILLIGRAM(S): 10 INJECTION, SOLUTION INTRAVENOUS at 09:47

## 2019-10-08 RX ADMIN — Medication 10 MILLIGRAM(S): at 09:38

## 2019-10-15 ENCOUNTER — LABORATORY RESULT (OUTPATIENT)
Age: 74
End: 2019-10-15

## 2019-10-15 ENCOUNTER — APPOINTMENT (OUTPATIENT)
Dept: INFUSION THERAPY | Facility: CLINIC | Age: 74
End: 2019-10-15

## 2019-10-15 LAB
HCT VFR BLD CALC: 36.5 %
HGB BLD-MCNC: 12.7 G/DL
MCHC RBC-ENTMCNC: 31.3 PG
MCHC RBC-ENTMCNC: 34.8 G/DL
MCV RBC AUTO: 89.9 FL
PLATELET # BLD AUTO: 29 K/UL
PMV BLD: 11 FL
RBC # BLD: 4.06 M/UL
RBC # FLD: 12.4 %
WBC # FLD AUTO: 7.51 K/UL

## 2019-10-15 RX ORDER — DEXAMETHASONE 0.5 MG/5ML
10 ELIXIR ORAL ONCE
Refills: 0 | Status: COMPLETED | OUTPATIENT
Start: 2019-10-15 | End: 2019-10-15

## 2019-10-15 RX ORDER — DIPHENHYDRAMINE HCL 50 MG
50 CAPSULE ORAL ONCE
Refills: 0 | Status: COMPLETED | OUTPATIENT
Start: 2019-10-15 | End: 2019-10-15

## 2019-10-15 RX ORDER — RITUXIMAB 10 MG/ML
700 INJECTION, SOLUTION INTRAVENOUS ONCE
Refills: 0 | Status: COMPLETED | OUTPATIENT
Start: 2019-10-15 | End: 2019-10-15

## 2019-10-15 RX ORDER — ACETAMINOPHEN 500 MG
650 TABLET ORAL ONCE
Refills: 0 | Status: COMPLETED | OUTPATIENT
Start: 2019-10-15 | End: 2019-10-15

## 2019-10-15 RX ADMIN — RITUXIMAB 175 MILLIGRAM(S): 10 INJECTION, SOLUTION INTRAVENOUS at 10:00

## 2019-10-15 RX ADMIN — Medication 650 MILLIGRAM(S): at 09:09

## 2019-10-15 RX ADMIN — Medication 10 MILLIGRAM(S): at 10:00

## 2019-10-15 RX ADMIN — Medication 102 MILLIGRAM(S): at 09:45

## 2019-10-15 RX ADMIN — Medication 650 MILLIGRAM(S): at 09:08

## 2019-10-15 RX ADMIN — RITUXIMAB 700 MILLIGRAM(S): 10 INJECTION, SOLUTION INTRAVENOUS at 12:30

## 2019-10-15 RX ADMIN — Medication 50 MILLIGRAM(S): at 09:40

## 2019-10-15 RX ADMIN — Medication 102 MILLIGRAM(S): at 09:08

## 2019-10-22 ENCOUNTER — APPOINTMENT (OUTPATIENT)
Dept: INFUSION THERAPY | Facility: CLINIC | Age: 74
End: 2019-10-22

## 2019-10-22 ENCOUNTER — LABORATORY RESULT (OUTPATIENT)
Age: 74
End: 2019-10-22

## 2019-10-22 LAB
HCT VFR BLD CALC: 37.2 %
HGB BLD-MCNC: 12.8 G/DL
MCHC RBC-ENTMCNC: 30.7 PG
MCHC RBC-ENTMCNC: 34.4 G/DL
MCV RBC AUTO: 89.2 FL
PLATELET # BLD AUTO: 41 K/UL
PMV BLD: 10.6 FL
RBC # BLD: 4.17 M/UL
RBC # FLD: 12.6 %
WBC # FLD AUTO: 8.05 K/UL

## 2019-10-22 RX ORDER — ACETAMINOPHEN 500 MG
650 TABLET ORAL ONCE
Refills: 0 | Status: COMPLETED | OUTPATIENT
Start: 2019-10-22 | End: 2019-10-22

## 2019-10-22 RX ORDER — RITUXIMAB 10 MG/ML
700 INJECTION, SOLUTION INTRAVENOUS ONCE
Refills: 0 | Status: COMPLETED | OUTPATIENT
Start: 2019-10-22 | End: 2019-10-22

## 2019-10-22 RX ORDER — DEXAMETHASONE 0.5 MG/5ML
10 ELIXIR ORAL ONCE
Refills: 0 | Status: COMPLETED | OUTPATIENT
Start: 2019-10-22 | End: 2019-10-22

## 2019-10-22 RX ORDER — DIPHENHYDRAMINE HCL 50 MG
50 CAPSULE ORAL ONCE
Refills: 0 | Status: COMPLETED | OUTPATIENT
Start: 2019-10-22 | End: 2019-10-22

## 2019-10-22 RX ADMIN — Medication 50 MILLIGRAM(S): at 12:00

## 2019-10-22 RX ADMIN — Medication 102 MILLIGRAM(S): at 11:20

## 2019-10-22 RX ADMIN — Medication 10 MILLIGRAM(S): at 11:40

## 2019-10-22 RX ADMIN — Medication 102 MILLIGRAM(S): at 11:40

## 2019-10-22 RX ADMIN — Medication 650 MILLIGRAM(S): at 11:21

## 2019-10-22 RX ADMIN — RITUXIMAB 700 MILLIGRAM(S): 10 INJECTION, SOLUTION INTRAVENOUS at 15:00

## 2019-10-22 RX ADMIN — RITUXIMAB 175 MILLIGRAM(S): 10 INJECTION, SOLUTION INTRAVENOUS at 12:09

## 2019-10-28 ENCOUNTER — APPOINTMENT (OUTPATIENT)
Dept: HEMATOLOGY ONCOLOGY | Facility: CLINIC | Age: 74
End: 2019-10-28
Payer: MEDICARE

## 2019-10-28 ENCOUNTER — LABORATORY RESULT (OUTPATIENT)
Age: 74
End: 2019-10-28

## 2019-10-28 VITALS
BODY MASS INDEX: 27.78 KG/M2 | HEART RATE: 88 BPM | DIASTOLIC BLOOD PRESSURE: 58 MMHG | TEMPERATURE: 97.4 F | HEIGHT: 67 IN | SYSTOLIC BLOOD PRESSURE: 122 MMHG | WEIGHT: 177 LBS | RESPIRATION RATE: 16 BRPM

## 2019-10-28 LAB
HCT VFR BLD CALC: 37.6 %
HGB BLD-MCNC: 13 G/DL
MCHC RBC-ENTMCNC: 30.4 PG
MCHC RBC-ENTMCNC: 34.6 G/DL
MCV RBC AUTO: 87.9 FL
PLATELET # BLD AUTO: 46 K/UL
PMV BLD: 11 FL
RBC # BLD: 4.28 M/UL
RBC # FLD: 12.9 %
WBC # FLD AUTO: 10.8 K/UL

## 2019-10-28 PROCEDURE — 99213 OFFICE O/P EST LOW 20 MIN: CPT

## 2019-10-29 ENCOUNTER — APPOINTMENT (OUTPATIENT)
Dept: INFUSION THERAPY | Facility: CLINIC | Age: 74
End: 2019-10-29

## 2019-10-30 NOTE — ASSESSMENT
[FreeTextEntry1] : 73 year old female with history of hypothyroidism  ( Hashimoto's ?) on replacement , Chronic ITP on steroids\par \par Recently started on rituxan, completed 4 doses. Due to drop in plts to 29 on 10/15/9, her prednisone was increased to 10 mg. Plts today is 46. \par \par Plan:\par --C/W same dose of prednisone\par --No more rituxan. Pt was explained that it will take some time to appreciate rituxan effect on ITP\par --RTO in 1 month\par

## 2019-10-30 NOTE — PHYSICAL EXAM
[Fully active, able to carry on all pre-disease performance without restriction] : Status 0 - Fully active, able to carry on all pre-disease performance without restriction [Normal] : normoactive bowel sounds, soft and nontender, no hepatosplenomegaly or masses appreciated [de-identified] : trace edema

## 2019-10-30 NOTE — HISTORY OF PRESENT ILLNESS
[de-identified] : 72 year old female referred by Dr Farzad Gray for thrombocytopenia . She was seen in this office 8 years ago for mild thrombocytopenia , work up is unavailable , she was lost for follow up and had uncomplicated gallbladder surgery in 2012, carpal tunnel surgery in November 2017 . platelet count was 174 in JUNE 2017 and 39 in November 2017 prior to carpal tunnel surgery . Most recent platelet count was 19 on June 8 . She has noted mild easy bruising on her chest and back , no mucosal bleeding . no new meds ASA or vitamins, Last colonoscopy 4 years ago for polyps, mammogram in August 2017 , she reports intentional weight loss of 30 lbs with diet . no fever , night sweats, rash , joint pains, he reports slight fatigue recently . \par  [de-identified] : 07/12/2018 : Patient returns for follow up after 2 courses of pulse decadron 20mg X 4. She complained of insomnia on the medication , she had no response to the first cycle and required Iv IG with good response. She denies any abnormal bleeding at this time. \par 09/06/2018 : patient returns for follow up , she is on prednisone 10mg daily and is frustrated with her inability to lose weight with diet and exercise. She is otherwise asymptomatic. \par \par 12/06/2018 Patient returns for follow up , she is currently on prednisone 5 mg , her weight is back to baseline , she continues on dyazide every other day for edema, she denies any abnormal bleeding , .\par \par 01/03/2019 Patient returns for follow up , she is on prednisone 5mg every other day , recent blood work showed K of 3.3 . she continues on dyazide every other day for edema . SHe is back to her baseline weight . She had three nosebleeds last month .\par \par 05/30/2019 Patient returns for follow up , she continues on prednisone 10mg daily and reports weight gain of 10 lbs . she denies any abnormal bleeding . She feels swollen after she stopped the diuretic and started on losartan 50mg for hypertension . She reports occasional orthostatic dizziness. \par \par 09/10/2019 Patient returns for follow up , she is on prednisone 5 mg alternating with 2.5 mg daily . she complains of generalized bruising including large echymosis on right shoulder , noted after colonoscopy and is possibly traumatic . platelet :45 . She has been on steroids for nearly 18 months and is " fed up " from the side effects including mood and weight fluctuations , hypertension . . \par \par 9/24/19: Pt returns for a f/u visit. She has no fresh complaints. Her previous shoulder bruise has resolved. She is currently on 5 mg PO prednisone daily. Her plts are 41,000 today. \par \par 10/28/19: Pt returns for a f/u visit. She has no fresh complaints. She completed 4 doses of rituxan. Due to drop in plts to 29 on 10/15/9, her prednisone was increased to 10 mg. Plts today is 46.

## 2019-11-25 ENCOUNTER — APPOINTMENT (OUTPATIENT)
Dept: HEMATOLOGY ONCOLOGY | Facility: CLINIC | Age: 74
End: 2019-11-25
Payer: MEDICARE

## 2019-11-25 ENCOUNTER — OUTPATIENT (OUTPATIENT)
Dept: OUTPATIENT SERVICES | Facility: HOSPITAL | Age: 74
LOS: 1 days | Discharge: HOME | End: 2019-11-25

## 2019-11-25 ENCOUNTER — LABORATORY RESULT (OUTPATIENT)
Age: 74
End: 2019-11-25

## 2019-11-25 VITALS
RESPIRATION RATE: 16 BRPM | HEIGHT: 67 IN | WEIGHT: 182 LBS | BODY MASS INDEX: 28.56 KG/M2 | DIASTOLIC BLOOD PRESSURE: 83 MMHG | SYSTOLIC BLOOD PRESSURE: 156 MMHG | TEMPERATURE: 96.3 F | HEART RATE: 78 BPM

## 2019-11-25 DIAGNOSIS — D69.3 IMMUNE THROMBOCYTOPENIC PURPURA: ICD-10-CM

## 2019-11-25 DIAGNOSIS — D69.6 THROMBOCYTOPENIA, UNSPECIFIED: ICD-10-CM

## 2019-11-25 LAB
ANION GAP SERPL CALC-SCNC: 14 MMOL/L
BUN SERPL-MCNC: 25 MG/DL
CALCIUM SERPL-MCNC: 9.3 MG/DL
CHLORIDE SERPL-SCNC: 104 MMOL/L
CO2 SERPL-SCNC: 23 MMOL/L
CREAT SERPL-MCNC: 1.2 MG/DL
GLUCOSE SERPL-MCNC: 92 MG/DL
HCT VFR BLD CALC: 34.5 %
HGB BLD-MCNC: 11.8 G/DL
MCHC RBC-ENTMCNC: 30.6 PG
MCHC RBC-ENTMCNC: 34.2 G/DL
MCV RBC AUTO: 89.4 FL
PLATELET # BLD AUTO: 63 K/UL
PMV BLD: 9.5 FL
POTASSIUM SERPL-SCNC: 3.9 MMOL/L
RBC # BLD: 3.86 M/UL
RBC # FLD: 12.6 %
SODIUM SERPL-SCNC: 141 MMOL/L
WBC # FLD AUTO: 7.91 K/UL

## 2019-11-25 PROCEDURE — 99213 OFFICE O/P EST LOW 20 MIN: CPT

## 2019-11-25 NOTE — HISTORY OF PRESENT ILLNESS
[de-identified] : 72 year old female referred by Dr Farzad Gray for thrombocytopenia . She was seen in this office 8 years ago for mild thrombocytopenia , work up is unavailable , she was lost for follow up and had uncomplicated gallbladder surgery in 2012, carpal tunnel surgery in November 2017 . platelet count was 174 in JUNE 2017 and 39 in November 2017 prior to carpal tunnel surgery . Most recent platelet count was 19 on June 8 . She has noted mild easy bruising on her chest and back , no mucosal bleeding . no new meds ASA or vitamins, Last colonoscopy 4 years ago for polyps, mammogram in August 2017 , she reports intentional weight loss of 30 lbs with diet . no fever , night sweats, rash , joint pains, he reports slight fatigue recently . \par  [de-identified] : 07/12/2018 : Patient returns for follow up after 2 courses of pulse decadron 20mg X 4. She complained of insomnia on the medication , she had no response to the first cycle and required Iv IG with good response. She denies any abnormal bleeding at this time. \par 09/06/2018 : patient returns for follow up , she is on prednisone 10mg daily and is frustrated with her inability to lose weight with diet and exercise. She is otherwise asymptomatic. \par \par 12/06/2018 Patient returns for follow up , she is currently on prednisone 5 mg , her weight is back to baseline , she continues on dyazide every other day for edema, she denies any abnormal bleeding , .\par \par 01/03/2019 Patient returns for follow up , she is on prednisone 5mg every other day , recent blood work showed K of 3.3 . she continues on dyazide every other day for edema . SHe is back to her baseline weight . She had three nosebleeds last month .\par \par 05/30/2019 Patient returns for follow up , she continues on prednisone 10mg daily and reports weight gain of 10 lbs . she denies any abnormal bleeding . She feels swollen after she stopped the diuretic and started on losartan 50mg for hypertension . She reports occasional orthostatic dizziness. \par \par 09/10/2019 Patient returns for follow up , she is on prednisone 5 mg alternating with 2.5 mg daily . she complains of generalized bruising including large echymosis on right shoulder , noted after colonoscopy and is possibly traumatic . platelet :45 . She has been on steroids for nearly 18 months and is " fed up " from the side effects including mood and weight fluctuations , hypertension . . \par \par 11/25/2019 Patient returns for follow up , she continues on prednisone 10mg daily . She denies any abnormal bleeding , leg swelling .

## 2019-11-25 NOTE — PHYSICAL EXAM
[Thrush] : no thrush [Normal] : no JVD, no calf tenderness, venous stasis changes, varices [de-identified] : multiple bruises on lower extremities and large echymosis on right shoulder .  [de-identified] : trace edema

## 2019-11-25 NOTE — ASSESSMENT
[FreeTextEntry1] : 72 year old female with history of hypothyroidism  ( Hashimoto's ?) on replacement , Chronic ITP \par S/P rituxan X 4 . on slow steroid taper , platelet slightly improved , will change prednosone to 10 alternating with 5 mg daily . follow up in one month .

## 2019-12-16 ENCOUNTER — RX RENEWAL (OUTPATIENT)
Age: 74
End: 2019-12-16

## 2019-12-23 ENCOUNTER — APPOINTMENT (OUTPATIENT)
Dept: HEMATOLOGY ONCOLOGY | Facility: CLINIC | Age: 74
End: 2019-12-23
Payer: MEDICARE

## 2019-12-23 ENCOUNTER — OUTPATIENT (OUTPATIENT)
Dept: OUTPATIENT SERVICES | Facility: HOSPITAL | Age: 74
LOS: 1 days | Discharge: HOME | End: 2019-12-23

## 2019-12-23 ENCOUNTER — LABORATORY RESULT (OUTPATIENT)
Age: 74
End: 2019-12-23

## 2019-12-23 VITALS
HEART RATE: 86 BPM | BODY MASS INDEX: 28.72 KG/M2 | WEIGHT: 183 LBS | TEMPERATURE: 97.1 F | SYSTOLIC BLOOD PRESSURE: 121 MMHG | RESPIRATION RATE: 16 BRPM | HEIGHT: 67 IN | DIASTOLIC BLOOD PRESSURE: 75 MMHG

## 2019-12-23 LAB
ANION GAP SERPL CALC-SCNC: 15 MMOL/L
BUN SERPL-MCNC: 27 MG/DL
CALCIUM SERPL-MCNC: 9.1 MG/DL
CHLORIDE SERPL-SCNC: 103 MMOL/L
CO2 SERPL-SCNC: 22 MMOL/L
CREAT SERPL-MCNC: 1.2 MG/DL
GLUCOSE SERPL-MCNC: 75 MG/DL
HCT VFR BLD CALC: 36.5 %
HGB BLD-MCNC: 12.1 G/DL
MCHC RBC-ENTMCNC: 30.4 PG
MCHC RBC-ENTMCNC: 33.2 G/DL
MCV RBC AUTO: 91.7 FL
PLATELET # BLD AUTO: 72 K/UL
PMV BLD: 9.1 FL
POTASSIUM SERPL-SCNC: 3.8 MMOL/L
RBC # BLD: 3.98 M/UL
RBC # FLD: 12.3 %
RETICS # AUTO: 2.2 %
RETICS AGGREG/RBC NFR: 87.8 K/UL
SODIUM SERPL-SCNC: 140 MMOL/L
WBC # FLD AUTO: 5.02 K/UL

## 2019-12-23 PROCEDURE — 99213 OFFICE O/P EST LOW 20 MIN: CPT

## 2019-12-24 LAB — FERRITIN SERPL-MCNC: 134 NG/ML

## 2020-01-03 NOTE — ASSESSMENT
[FreeTextEntry1] : 72 year old female with history of hypothyroidism  ( Hashimoto's ?) on replacement , Chronic ITP \par S/P rituxan X 4 . on slow steroid taper , platelet improving slowly . Will  reduce prednisone to 5 mg daily . repeat cbc in 6 weeks . also check retic and ferritin today .\par She will follow up with a hematologist in [place] in January 2020 /

## 2020-01-03 NOTE — HISTORY OF PRESENT ILLNESS
[de-identified] : 72 year old female referred by Dr Farzad Gray for thrombocytopenia . She was seen in this office 8 years ago for mild thrombocytopenia , work up is unavailable , she was lost for follow up and had uncomplicated gallbladder surgery in 2012, carpal tunnel surgery in November 2017 . platelet count was 174 in JUNE 2017 and 39 in November 2017 prior to carpal tunnel surgery . Most recent platelet count was 19 on June 8 . She has noted mild easy bruising on her chest and back , no mucosal bleeding . no new meds ASA or vitamins, Last colonoscopy 4 years ago for polyps, mammogram in August 2017 , she reports intentional weight loss of 30 lbs with diet . no fever , night sweats, rash , joint pains, he reports slight fatigue recently . \par  [de-identified] : 07/12/2018 : Patient returns for follow up after 2 courses of pulse decadron 20mg X 4. She complained of insomnia on the medication , she had no response to the first cycle and required Iv IG with good response. She denies any abnormal bleeding at this time. \par 09/06/2018 : patient returns for follow up , she is on prednisone 10mg daily and is frustrated with her inability to lose weight with diet and exercise. She is otherwise asymptomatic. \par \par 12/06/2018 Patient returns for follow up , she is currently on prednisone 5 mg , her weight is back to baseline , she continues on dyazide every other day for edema, she denies any abnormal bleeding , .\par \par 01/03/2019 Patient returns for follow up , she is on prednisone 5mg every other day , recent blood work showed K of 3.3 . she continues on dyazide every other day for edema . SHe is back to her baseline weight . She had three nosebleeds last month .\par \par 05/30/2019 Patient returns for follow up , she continues on prednisone 10mg daily and reports weight gain of 10 lbs . she denies any abnormal bleeding . She feels swollen after she stopped the diuretic and started on losartan 50mg for hypertension . She reports occasional orthostatic dizziness. \par \par 09/10/2019 Patient returns for follow up , she is on prednisone 5 mg alternating with 2.5 mg daily . she complains of generalized bruising including large echymosis on right shoulder , noted after colonoscopy and is possibly traumatic . platelet :45 . She has been on steroids for nearly 18 months and is " fed up " from the side effects including mood and weight fluctuations , hypertension . . \par \par 11/25/2019 Patient returns for follow up , she continues on prednisone 10mg daily . She denies any abnormal bleeding , leg swelling . \par \par 12/23/2019 Patient returns for follow up , she complains only of mild fatigue . CBC last week at Dr Gray showed Hb : 11.8 , platelets : 82 .

## 2020-01-03 NOTE — PHYSICAL EXAM
[Normal] : clear to auscultation bilaterally, no dullness, no wheezing [Thrush] : no thrush [de-identified] : no bruising or petechiae . .  [de-identified] : no edema

## 2020-01-22 DIAGNOSIS — D69.6 THROMBOCYTOPENIA, UNSPECIFIED: ICD-10-CM

## 2020-03-04 DIAGNOSIS — D69.3 IMMUNE THROMBOCYTOPENIC PURPURA: ICD-10-CM

## 2020-06-29 ENCOUNTER — RX RENEWAL (OUTPATIENT)
Age: 75
End: 2020-06-29

## 2020-08-03 ENCOUNTER — LABORATORY RESULT (OUTPATIENT)
Age: 75
End: 2020-08-03

## 2020-08-03 ENCOUNTER — APPOINTMENT (OUTPATIENT)
Dept: HEMATOLOGY ONCOLOGY | Facility: CLINIC | Age: 75
End: 2020-08-03
Payer: MEDICARE

## 2020-08-03 VITALS
HEART RATE: 73 BPM | HEIGHT: 67 IN | TEMPERATURE: 98.1 F | RESPIRATION RATE: 16 BRPM | DIASTOLIC BLOOD PRESSURE: 72 MMHG | WEIGHT: 184 LBS | SYSTOLIC BLOOD PRESSURE: 151 MMHG | BODY MASS INDEX: 28.88 KG/M2

## 2020-08-03 LAB
ALBUMIN SERPL ELPH-MCNC: 4.7 G/DL
ALP BLD-CCNC: 64 U/L
ALT SERPL-CCNC: 17 U/L
ANION GAP SERPL CALC-SCNC: 13 MMOL/L
AST SERPL-CCNC: 18 U/L
BILIRUB SERPL-MCNC: 0.7 MG/DL
BUN SERPL-MCNC: 26 MG/DL
CALCIUM SERPL-MCNC: 9.3 MG/DL
CHLORIDE SERPL-SCNC: 107 MMOL/L
CO2 SERPL-SCNC: 22 MMOL/L
CREAT SERPL-MCNC: 1.5 MG/DL
GLUCOSE SERPL-MCNC: 99 MG/DL
HCT VFR BLD CALC: 34.8 %
HGB BLD-MCNC: 11.8 G/DL
MCHC RBC-ENTMCNC: 30.5 PG
MCHC RBC-ENTMCNC: 33.9 G/DL
MCV RBC AUTO: 89.9 FL
PLATELET # BLD AUTO: 86 K/UL
PMV BLD: 9.7 FL
POTASSIUM SERPL-SCNC: 4 MMOL/L
PROT SERPL-MCNC: 6.6 G/DL
RBC # BLD: 3.87 M/UL
RBC # FLD: 11.9 %
SODIUM SERPL-SCNC: 142 MMOL/L
WBC # FLD AUTO: 7.53 K/UL

## 2020-08-03 PROCEDURE — 99213 OFFICE O/P EST LOW 20 MIN: CPT

## 2020-08-05 NOTE — ASSESSMENT
[FreeTextEntry1] : 72 year old female with history of hypothyroidism  ( Hashimoto's ?) on replacement , Chronic ITP s/p rituxan X 4 (10/2019), on slow steroid taper - Had bruising and ecchymoses when tried to taper prednisone to 2.5 mg daily - Currently on prednisone 5 mg daily. Platelets ranging btw 70-80K\par \par Plan:\par - cbc reviewed today. Platelets are 82K \par - We will taper prednisone to 5mg alternating with 2.5 mg daily.\par - If her platelets drops again, we will consider rituxan again\par \par RTC in 6 weeks\par The patient was seen an examined by Dr Cade who agreed with the above plan

## 2020-08-05 NOTE — PHYSICAL EXAM
[Fully active, able to carry on all pre-disease performance without restriction] : Status 0 - Fully active, able to carry on all pre-disease performance without restriction [Normal] : normoactive bowel sounds, soft and nontender, no hepatosplenomegaly or masses appreciated [Thrush] : no thrush [de-identified] : no bruising or petechiae . .  [de-identified] : no edema

## 2020-08-05 NOTE — HISTORY OF PRESENT ILLNESS
[de-identified] : 72 year old female referred by Dr Farzad Gray for thrombocytopenia . She was seen in this office 8 years ago for mild thrombocytopenia , work up is unavailable , she was lost for follow up and had uncomplicated gallbladder surgery in 2012, carpal tunnel surgery in November 2017 . platelet count was 174 in JUNE 2017 and 39 in November 2017 prior to carpal tunnel surgery . Most recent platelet count was 19 on June 8 . She has noted mild easy bruising on her chest and back , no mucosal bleeding . no new meds ASA or vitamins, Last colonoscopy 4 years ago for polyps, mammogram in August 2017 , she reports intentional weight loss of 30 lbs with diet . no fever , night sweats, rash , joint pains, he reports slight fatigue recently . \par  [de-identified] : 07/12/2018 : Patient returns for follow up after 2 courses of pulse decadron 20mg X 4. She complained of insomnia on the medication , she had no response to the first cycle and required Iv IG with good response. She denies any abnormal bleeding at this time. \par 09/06/2018 : patient returns for follow up , she is on prednisone 10mg daily and is frustrated with her inability to lose weight with diet and exercise. She is otherwise asymptomatic. \par \par 12/06/2018 Patient returns for follow up , she is currently on prednisone 5 mg , her weight is back to baseline , she continues on dyazide every other day for edema, she denies any abnormal bleeding , .\par \par 01/03/2019 Patient returns for follow up , she is on prednisone 5mg every other day , recent blood work showed K of 3.3 . she continues on dyazide every other day for edema . SHe is back to her baseline weight . She had three nosebleeds last month .\par \par 05/30/2019 Patient returns for follow up , she continues on prednisone 10mg daily and reports weight gain of 10 lbs . she denies any abnormal bleeding . She feels swollen after she stopped the diuretic and started on losartan 50mg for hypertension . She reports occasional orthostatic dizziness. \par \par 09/10/2019 Patient returns for follow up , she is on prednisone 5 mg alternating with 2.5 mg daily . she complains of generalized bruising including large echymosis on right shoulder , noted after colonoscopy and is possibly traumatic . platelet :45 . She has been on steroids for nearly 18 months and is " fed up " from the side effects including mood and weight fluctuations , hypertension . . \par \par 11/25/2019 Patient returns for follow up , she continues on prednisone 10mg daily . She denies any abnormal bleeding , leg swelling . \par \par 12/23/2019 Patient returns for follow up , she complains only of mild fatigue . CBC last week at Dr Gray showed Hb : 11.8 , platelets : 82 .\par \par 8/3/2020: The patient is here for follow up. She has no major complaints. She denies any episode of bleeding. 2 weeks ago, she had spontaneous bruising of legs and breast, which resolved . She is on prednisone 5 mg daily for more than 6 months. Her labs done in [place] showing platelets level ranging between 60-80

## 2020-09-14 ENCOUNTER — LABORATORY RESULT (OUTPATIENT)
Age: 75
End: 2020-09-14

## 2020-09-14 ENCOUNTER — OUTPATIENT (OUTPATIENT)
Dept: OUTPATIENT SERVICES | Facility: HOSPITAL | Age: 75
LOS: 1 days | Discharge: HOME | End: 2020-09-14

## 2020-09-14 ENCOUNTER — APPOINTMENT (OUTPATIENT)
Dept: HEMATOLOGY ONCOLOGY | Facility: CLINIC | Age: 75
End: 2020-09-14
Payer: MEDICARE

## 2020-09-14 VITALS
WEIGHT: 180 LBS | RESPIRATION RATE: 16 BRPM | TEMPERATURE: 98 F | DIASTOLIC BLOOD PRESSURE: 60 MMHG | SYSTOLIC BLOOD PRESSURE: 127 MMHG | BODY MASS INDEX: 28.25 KG/M2 | HEART RATE: 75 BPM | HEIGHT: 67 IN

## 2020-09-14 DIAGNOSIS — D69.6 THROMBOCYTOPENIA, UNSPECIFIED: ICD-10-CM

## 2020-09-14 LAB
HCT VFR BLD CALC: 34 %
HGB BLD-MCNC: 11.8 G/DL
MCHC RBC-ENTMCNC: 30.7 PG
MCHC RBC-ENTMCNC: 34.7 G/DL
MCV RBC AUTO: 88.5 FL
PLATELET # BLD AUTO: 82 K/UL
PMV BLD: 10.5 FL
RBC # BLD: 3.84 M/UL
RBC # FLD: 12.1 %
WBC # FLD AUTO: 8.44 K/UL

## 2020-09-14 PROCEDURE — 99214 OFFICE O/P EST MOD 30 MIN: CPT

## 2020-09-14 RX ORDER — LOSARTAN POTASSIUM 25 MG/1
25 TABLET, FILM COATED ORAL
Qty: 90 | Refills: 1 | Status: ACTIVE | COMMUNITY
Start: 2019-06-07 | End: 1900-01-01

## 2020-09-14 RX ORDER — PREDNISONE 10 MG/1
10 TABLET ORAL DAILY
Qty: 90 | Refills: 2 | Status: DISCONTINUED | COMMUNITY
Start: 2018-07-12 | End: 2020-09-14

## 2020-09-14 NOTE — ASSESSMENT
[FreeTextEntry1] : 72 year old female with history of hypothyroidism  ( Hashimoto's ?) on replacement , Chronic ITP s/p rituxan X 4 (10/2019), on slow steroid taper - Had bruising and ecchymoses when tried to taper prednisone to 2.5 mg daily - Currently on prednisone 5 mg  alternating with 2.5 mg every other day . Platelets ranging btw 70-80K\par \par Plan:\par - cbc reviewed today. Platelets are 82K \par - We will taper prednisone to 5mg every other day\par - If her platelets drops again, we will consider repeat  rituxan .\par \par RTC in  3 months\par The patient was seen an examined by Dr Cade who agreed with the above plan

## 2020-09-14 NOTE — HISTORY OF PRESENT ILLNESS
[de-identified] : 72 year old female referred by Dr Farzad Gray for thrombocytopenia . She was seen in this office 8 years ago for mild thrombocytopenia , work up is unavailable , she was lost for follow up and had uncomplicated gallbladder surgery in 2012, carpal tunnel surgery in November 2017 . platelet count was 174 in JUNE 2017 and 39 in November 2017 prior to carpal tunnel surgery . Most recent platelet count was 19 on June 8 . She has noted mild easy bruising on her chest and back , no mucosal bleeding . no new meds ASA or vitamins, Last colonoscopy 4 years ago for polyps, mammogram in August 2017 , she reports intentional weight loss of 30 lbs with diet . no fever , night sweats, rash , joint pains, he reports slight fatigue recently . \par  [de-identified] : 07/12/2018 : Patient returns for follow up after 2 courses of pulse decadron 20mg X 4. She complained of insomnia on the medication , she had no response to the first cycle and required Iv IG with good response. She denies any abnormal bleeding at this time. \par 09/06/2018 : patient returns for follow up , she is on prednisone 10mg daily and is frustrated with her inability to lose weight with diet and exercise. She is otherwise asymptomatic. \par \par 12/06/2018 Patient returns for follow up , she is currently on prednisone 5 mg , her weight is back to baseline , she continues on dyazide every other day for edema, she denies any abnormal bleeding , .\par \par 01/03/2019 Patient returns for follow up , she is on prednisone 5mg every other day , recent blood work showed K of 3.3 . she continues on dyazide every other day for edema . SHe is back to her baseline weight . She had three nosebleeds last month .\par \par 05/30/2019 Patient returns for follow up , she continues on prednisone 10mg daily and reports weight gain of 10 lbs . she denies any abnormal bleeding . She feels swollen after she stopped the diuretic and started on losartan 50mg for hypertension . She reports occasional orthostatic dizziness. \par \par 09/10/2019 Patient returns for follow up , she is on prednisone 5 mg alternating with 2.5 mg daily . she complains of generalized bruising including large echymosis on right shoulder , noted after colonoscopy and is possibly traumatic . platelet :45 . She has been on steroids for nearly 18 months and is " fed up " from the side effects including mood and weight fluctuations , hypertension . . \par \par 11/25/2019 Patient returns for follow up , she continues on prednisone 10mg daily . She denies any abnormal bleeding , leg swelling . \par \par 12/23/2019 Patient returns for follow up , she complains only of mild fatigue . CBC last week at Dr Gray showed Hb : 11.8 , platelets : 82 .\par \par 8/3/2020: The patient is here for follow up. She has no major complaints. She denies any episode of bleeding. 2 weeks ago, she had spontaneous bruising of legs and breast, which resolved . She is on prednisone 5 mg daily for more than 6 months. Her labs done in [place] showing platelets level ranging between 60-80\par \par 9/14/2020: The patient is here for follow up. She is on prednisone 5 mg alternating with 2.5 mg daily . Last week, she reported spontaneous bruising, but resolved. No other bleeding.

## 2020-09-14 NOTE — PHYSICAL EXAM
[Fully active, able to carry on all pre-disease performance without restriction] : Status 0 - Fully active, able to carry on all pre-disease performance without restriction [Normal] : full range of motion and no deformities appreciated [Thrush] : no thrush [de-identified] : no edema [de-identified] : no bruising or petechiae . .

## 2020-09-17 ENCOUNTER — APPOINTMENT (OUTPATIENT)
Dept: OBGYN | Facility: CLINIC | Age: 75
End: 2020-09-17
Payer: MEDICARE

## 2020-09-17 VITALS
HEIGHT: 67 IN | WEIGHT: 180 LBS | DIASTOLIC BLOOD PRESSURE: 70 MMHG | BODY MASS INDEX: 28.25 KG/M2 | SYSTOLIC BLOOD PRESSURE: 140 MMHG

## 2020-09-17 PROCEDURE — G0101: CPT

## 2020-09-22 ENCOUNTER — RX RENEWAL (OUTPATIENT)
Age: 75
End: 2020-09-22

## 2020-12-14 ENCOUNTER — APPOINTMENT (OUTPATIENT)
Dept: HEMATOLOGY ONCOLOGY | Facility: CLINIC | Age: 75
End: 2020-12-14
Payer: MEDICARE

## 2020-12-14 ENCOUNTER — OUTPATIENT (OUTPATIENT)
Dept: OUTPATIENT SERVICES | Facility: HOSPITAL | Age: 75
LOS: 1 days | Discharge: HOME | End: 2020-12-14

## 2020-12-14 ENCOUNTER — LABORATORY RESULT (OUTPATIENT)
Age: 75
End: 2020-12-14

## 2020-12-14 VITALS
BODY MASS INDEX: 28.25 KG/M2 | HEIGHT: 67 IN | HEART RATE: 82 BPM | TEMPERATURE: 98 F | DIASTOLIC BLOOD PRESSURE: 70 MMHG | SYSTOLIC BLOOD PRESSURE: 111 MMHG | WEIGHT: 180 LBS | RESPIRATION RATE: 18 BRPM

## 2020-12-14 LAB
HCT VFR BLD CALC: 34.4 %
HGB BLD-MCNC: 11.9 G/DL
MCHC RBC-ENTMCNC: 30.3 PG
MCHC RBC-ENTMCNC: 34.6 G/DL
MCV RBC AUTO: 87.5 FL
PLATELET # BLD AUTO: 84 K/UL
PMV BLD: 10.2 FL
RBC # BLD: 3.93 M/UL
RBC # FLD: 11.8 %
WBC # FLD AUTO: 6.85 K/UL

## 2020-12-14 PROCEDURE — 99213 OFFICE O/P EST LOW 20 MIN: CPT

## 2020-12-14 RX ORDER — TRIAMTERENE AND HYDROCHLOROTHIAZIDE 25; 37.5 MG/1; MG/1
37.5-25 TABLET ORAL
Qty: 45 | Refills: 1 | Status: ACTIVE | COMMUNITY
Start: 2018-10-10 | End: 1900-01-01

## 2020-12-14 NOTE — HISTORY OF PRESENT ILLNESS
[de-identified] : 72 year old female referred by Dr Farzad Gray for thrombocytopenia . She was seen in this office 8 years ago for mild thrombocytopenia , work up is unavailable , she was lost for follow up and had uncomplicated gallbladder surgery in 2012, carpal tunnel surgery in November 2017 . platelet count was 174 in JUNE 2017 and 39 in November 2017 prior to carpal tunnel surgery . Most recent platelet count was 19 on June 8 . She has noted mild easy bruising on her chest and back , no mucosal bleeding . no new meds ASA or vitamins, Last colonoscopy 4 years ago for polyps, mammogram in August 2017 , she reports intentional weight loss of 30 lbs with diet . no fever , night sweats, rash , joint pains, he reports slight fatigue recently . \par  [de-identified] : 07/12/2018 : Patient returns for follow up after 2 courses of pulse decadron 20mg X 4. She complained of insomnia on the medication , she had no response to the first cycle and required Iv IG with good response. She denies any abnormal bleeding at this time. \par 09/06/2018 : patient returns for follow up , she is on prednisone 10mg daily and is frustrated with her inability to lose weight with diet and exercise. She is otherwise asymptomatic. \par \par 12/06/2018 Patient returns for follow up , she is currently on prednisone 5 mg , her weight is back to baseline , she continues on dyazide every other day for edema, she denies any abnormal bleeding , .\par \par 01/03/2019 Patient returns for follow up , she is on prednisone 5mg every other day , recent blood work showed K of 3.3 . she continues on dyazide every other day for edema . SHe is back to her baseline weight . She had three nosebleeds last month .\par \par 05/30/2019 Patient returns for follow up , she continues on prednisone 10mg daily and reports weight gain of 10 lbs . she denies any abnormal bleeding . She feels swollen after she stopped the diuretic and started on losartan 50mg for hypertension . She reports occasional orthostatic dizziness. \par \par 09/10/2019 Patient returns for follow up , she is on prednisone 5 mg alternating with 2.5 mg daily . she complains of generalized bruising including large echymosis on right shoulder , noted after colonoscopy and is possibly traumatic . platelet :45 . She has been on steroids for nearly 18 months and is " fed up " from the side effects including mood and weight fluctuations , hypertension . . \par \par 11/25/2019 Patient returns for follow up , she continues on prednisone 10mg daily . She denies any abnormal bleeding , leg swelling . \par \par 12/23/2019 Patient returns for follow up , she complains only of mild fatigue . CBC last week at Dr Gray showed Hb : 11.8 , platelets : 82 .\par \par 8/3/2020: The patient is here for follow up. She has no major complaints. She denies any episode of bleeding. 2 weeks ago, she had spontaneous bruising of legs and breast, which resolved . She is on prednisone 5 mg daily for more than 6 months. Her labs done in [place] showing platelets level ranging between 60-80\par \par 9/14/2020: The patient is here for follow up. She is on prednisone 5 mg alternating with 2.5 mg daily . Last week, she reported spontaneous bruising, but resolved. No other bleeding. \par \par 12/14/2020: The patient is here for follow up. She has been on Prednisone 5 mg every other day since September. She denies any bruising or bleeding. She is going to [place] on Jan 9,2021 and planning to stay till June. She has a hematologist in [place].

## 2020-12-14 NOTE — PHYSICAL EXAM
[Fully active, able to carry on all pre-disease performance without restriction] : Status 0 - Fully active, able to carry on all pre-disease performance without restriction [Normal] : full range of motion and no deformities appreciated [Thrush] : no thrush [de-identified] : no edema [de-identified] : no bruising or petechiae . .

## 2020-12-15 ENCOUNTER — RX RENEWAL (OUTPATIENT)
Age: 75
End: 2020-12-15

## 2020-12-15 RX ORDER — TRIAMTERENE AND HYDROCHLOROTHIAZIDE 25; 37.5 MG/1; MG/1
37.5-25 TABLET ORAL
Qty: 45 | Refills: 0 | Status: ACTIVE | COMMUNITY
Start: 2019-06-07 | End: 1900-01-01

## 2021-01-06 DIAGNOSIS — D69.6 THROMBOCYTOPENIA, UNSPECIFIED: ICD-10-CM

## 2021-02-24 RX ORDER — PREDNISONE 5 MG/1
5 TABLET ORAL
Qty: 45 | Refills: 1 | Status: ACTIVE | COMMUNITY
Start: 2020-09-14 | End: 1900-01-01

## 2021-07-12 ENCOUNTER — APPOINTMENT (OUTPATIENT)
Dept: HEMATOLOGY ONCOLOGY | Facility: CLINIC | Age: 76
End: 2021-07-12
Payer: MEDICARE

## 2021-07-12 ENCOUNTER — LABORATORY RESULT (OUTPATIENT)
Age: 76
End: 2021-07-12

## 2021-07-12 VITALS
HEART RATE: 82 BPM | DIASTOLIC BLOOD PRESSURE: 77 MMHG | RESPIRATION RATE: 20 BRPM | SYSTOLIC BLOOD PRESSURE: 113 MMHG | TEMPERATURE: 98.6 F | BODY MASS INDEX: 27.94 KG/M2 | HEIGHT: 67 IN | WEIGHT: 178 LBS

## 2021-07-12 LAB
HCT VFR BLD CALC: 34.6 %
HGB BLD-MCNC: 11.8 G/DL
MCHC RBC-ENTMCNC: 30.6 PG
MCHC RBC-ENTMCNC: 34.1 G/DL
MCV RBC AUTO: 89.9 FL
PLATELET # BLD AUTO: 141 K/UL
PMV BLD: 9 FL
RBC # BLD: 3.85 M/UL
RBC # FLD: 12 %
WBC # FLD AUTO: 5.16 K/UL

## 2021-07-12 PROCEDURE — 99213 OFFICE O/P EST LOW 20 MIN: CPT

## 2021-07-12 RX ORDER — MAGNESIUM 200 MG
1000 TABLET ORAL
Qty: 30 | Refills: 0 | Status: ACTIVE | COMMUNITY
Start: 2021-07-12 | End: 1900-01-01

## 2021-07-12 NOTE — HISTORY OF PRESENT ILLNESS
[de-identified] : 72 year old female referred by Dr Farzad Gray for thrombocytopenia . She was seen in this office 8 years ago for mild thrombocytopenia , work up is unavailable , she was lost for follow up and had uncomplicated gallbladder surgery in 2012, carpal tunnel surgery in November 2017 . platelet count was 174 in JUNE 2017 and 39 in November 2017 prior to carpal tunnel surgery . Most recent platelet count was 19 on June 8 . She has noted mild easy bruising on her chest and back , no mucosal bleeding . no new meds ASA or vitamins, Last colonoscopy 4 years ago for polyps, mammogram in August 2017 , she reports intentional weight loss of 30 lbs with diet . no fever , night sweats, rash , joint pains, he reports slight fatigue recently . \par  [de-identified] : 07/12/2018 : Patient returns for follow up after 2 courses of pulse decadron 20mg X 4. She complained of insomnia on the medication , she had no response to the first cycle and required Iv IG with good response. She denies any abnormal bleeding at this time. \par 09/06/2018 : patient returns for follow up , she is on prednisone 10mg daily and is frustrated with her inability to lose weight with diet and exercise. She is otherwise asymptomatic. \par \par 12/06/2018 Patient returns for follow up , she is currently on prednisone 5 mg , her weight is back to baseline , she continues on dyazide every other day for edema, she denies any abnormal bleeding , .\par \par 01/03/2019 Patient returns for follow up , she is on prednisone 5mg every other day , recent blood work showed K of 3.3 . she continues on dyazide every other day for edema . SHe is back to her baseline weight . She had three nosebleeds last month .\par \par 05/30/2019 Patient returns for follow up , she continues on prednisone 10mg daily and reports weight gain of 10 lbs . she denies any abnormal bleeding . She feels swollen after she stopped the diuretic and started on losartan 50mg for hypertension . She reports occasional orthostatic dizziness. \par \par 09/10/2019 Patient returns for follow up , she is on prednisone 5 mg alternating with 2.5 mg daily . she complains of generalized bruising including large echymosis on right shoulder , noted after colonoscopy and is possibly traumatic . platelet :45 . She has been on steroids for nearly 18 months and is " fed up " from the side effects including mood and weight fluctuations , hypertension . . \par \par 11/25/2019 Patient returns for follow up , she continues on prednisone 10mg daily . She denies any abnormal bleeding , leg swelling . \par \par 12/23/2019 Patient returns for follow up , she complains only of mild fatigue . CBC last week at Dr Gray showed Hb : 11.8 , platelets : 82 .\par \par 07/12/2021 Patient returns for interval follow up , she continues on prednisone 2.5 mg every other day . She started on sublingual B12 while in [place] for mild anemia and low B12 ( serology for pernicious anemia was allegedly negative ) , last Hgb in May was back to normal . She denies anty abnormal bleeding .

## 2021-07-12 NOTE — ASSESSMENT
[FreeTextEntry1] : 75 year old female with history of hypothyroidism  ( Hashimoto's ?) on replacement , Chronic ITP s/p rituxan X 4 (10/2019), on slow steroid taper - Had bruising and ecchymoses when tried to taper prednisone to 2.5 mg daily - Currently on prednisone 2.5 mg   every other day . Platelets : 144 \par Suspected B12 deficiency responding to sublingual replacement > \par \par Plan: discontinue prednisone . \par          change B12 to twice per week . \par          follow up in 2 months for repeat CBC , B12 , ferritin , retic . LDH.

## 2021-07-12 NOTE — PHYSICAL EXAM
[Normal] : normoactive bowel sounds, soft and nontender, no hepatosplenomegaly or masses appreciated [Thrush] : no thrush [de-identified] : no edema [de-identified] : no bruising or petechiae . .

## 2021-07-24 ENCOUNTER — OUTPATIENT (OUTPATIENT)
Dept: OUTPATIENT SERVICES | Facility: HOSPITAL | Age: 76
LOS: 1 days | Discharge: HOME | End: 2021-07-24
Payer: MEDICARE

## 2021-07-24 DIAGNOSIS — N20.2 CALCULUS OF KIDNEY WITH CALCULUS OF URETER: ICD-10-CM

## 2021-07-24 PROCEDURE — 76770 US EXAM ABDO BACK WALL COMP: CPT | Mod: 26

## 2021-10-07 ENCOUNTER — APPOINTMENT (OUTPATIENT)
Dept: HEMATOLOGY ONCOLOGY | Facility: CLINIC | Age: 76
End: 2021-10-07
Payer: MEDICARE

## 2021-10-07 ENCOUNTER — LABORATORY RESULT (OUTPATIENT)
Age: 76
End: 2021-10-07

## 2021-10-07 ENCOUNTER — OUTPATIENT (OUTPATIENT)
Dept: OUTPATIENT SERVICES | Facility: HOSPITAL | Age: 76
LOS: 1 days | Discharge: HOME | End: 2021-10-07

## 2021-10-07 VITALS
WEIGHT: 168 LBS | DIASTOLIC BLOOD PRESSURE: 89 MMHG | OXYGEN SATURATION: 98 % | HEART RATE: 79 BPM | RESPIRATION RATE: 20 BRPM | HEIGHT: 67 IN | BODY MASS INDEX: 26.37 KG/M2 | TEMPERATURE: 98.7 F | SYSTOLIC BLOOD PRESSURE: 149 MMHG

## 2021-10-07 DIAGNOSIS — D69.6 THROMBOCYTOPENIA, UNSPECIFIED: ICD-10-CM

## 2021-10-07 PROCEDURE — 99213 OFFICE O/P EST LOW 20 MIN: CPT

## 2021-10-07 NOTE — HISTORY OF PRESENT ILLNESS
[de-identified] : 72 year old female referred by Dr Farzad Gray for thrombocytopenia . She was seen in this office 8 years ago for mild thrombocytopenia , work up is unavailable , she was lost for follow up and had uncomplicated gallbladder surgery in 2012, carpal tunnel surgery in November 2017 . platelet count was 174 in JUNE 2017 and 39 in November 2017 prior to carpal tunnel surgery . Most recent platelet count was 19 on June 8 . She has noted mild easy bruising on her chest and back , no mucosal bleeding . no new meds ASA or vitamins, Last colonoscopy 4 years ago for polyps, mammogram in August 2017 , she reports intentional weight loss of 30 lbs with diet . no fever , night sweats, rash , joint pains, he reports slight fatigue recently . \par  [de-identified] : 07/12/2018 : Patient returns for follow up after 2 courses of pulse decadron 20mg X 4. She complained of insomnia on the medication , she had no response to the first cycle and required Iv IG with good response. She denies any abnormal bleeding at this time. \par 09/06/2018 : patient returns for follow up , she is on prednisone 10mg daily and is frustrated with her inability to lose weight with diet and exercise. She is otherwise asymptomatic. \par \par 12/06/2018 Patient returns for follow up , she is currently on prednisone 5 mg , her weight is back to baseline , she continues on dyazide every other day for edema, she denies any abnormal bleeding , .\par \par 01/03/2019 Patient returns for follow up , she is on prednisone 5mg every other day , recent blood work showed K of 3.3 . she continues on dyazide every other day for edema . SHe is back to her baseline weight . She had three nosebleeds last month .\par \par 05/30/2019 Patient returns for follow up , she continues on prednisone 10mg daily and reports weight gain of 10 lbs . she denies any abnormal bleeding . She feels swollen after she stopped the diuretic and started on losartan 50mg for hypertension . She reports occasional orthostatic dizziness. \par \par 09/10/2019 Patient returns for follow up , she is on prednisone 5 mg alternating with 2.5 mg daily . she complains of generalized bruising including large echymosis on right shoulder , noted after colonoscopy and is possibly traumatic . platelet :45 . She has been on steroids for nearly 18 months and is " fed up " from the side effects including mood and weight fluctuations , hypertension . . \par \par 11/25/2019 Patient returns for follow up , she continues on prednisone 10mg daily . She denies any abnormal bleeding , leg swelling . \par \par 12/23/2019 Patient returns for follow up , she complains only of mild fatigue . CBC last week at Dr Gray showed Hb : 11.8 , platelets : 82 .\par \par 07/12/2021 Patient returns for interval follow up , she continues on prednisone 2.5 mg every other day . She started on sublingual B12 while in [place] for mild anemia and low B12 ( serology for pernicious anemia was allegedly negative ) , last Hgb in May was back to normal . She denies anty abnormal bleeding . \par \par 10/7/2021 Patient returns for history of ITP . She reports dark colored urine for one day and thinks she has UTI . no other abnormal bleeding . she was was seen recently by Dr Gray and asked to stop losartan due to increased Cr , renal sono was negative in 7?2021 .

## 2021-10-07 NOTE — ASSESSMENT
[FreeTextEntry1] : 75 year old female with history of hypothyroidism  ( Hashimoto's ?) on replacement , Chronic ITP s/p rituxan X 4 (10/2019), off steroid  - Platelets are normal . \par suspected UTI \par Suspected B12 deficiency responding to sublingual replacement , every 3 days . \par \par Plan: urinalysis ,urine culture . treat if needed

## 2021-10-07 NOTE — PHYSICAL EXAM
[Normal] : normoactive bowel sounds, soft and nontender, no hepatosplenomegaly or masses appreciated [Thrush] : no thrush [de-identified] : no edema [de-identified] : no bruising or petechiae . .

## 2021-10-08 LAB
APPEARANCE: CLEAR
BILIRUBIN URINE: NEGATIVE
BLOOD URINE: NEGATIVE
COLOR: YELLOW
FERRITIN SERPL-MCNC: 112 NG/ML
GLUCOSE QUALITATIVE U: NEGATIVE
HCT VFR BLD CALC: 35.6 %
HGB BLD-MCNC: 12 G/DL
KETONES URINE: NEGATIVE
LDH SERPL-CCNC: 197
LEUKOCYTE ESTERASE URINE: ABNORMAL
MCHC RBC-ENTMCNC: 30 PG
MCHC RBC-ENTMCNC: 33.7 G/DL
MCV RBC AUTO: 89 FL
NITRITE URINE: NEGATIVE
PH URINE: 6
PLATELET # BLD AUTO: 132 K/UL
PMV BLD: 9 FL
PROTEIN URINE: NEGATIVE
RBC # BLD: 4 M/UL
RBC # FLD: 12.4 %
RETICS # AUTO: 1.8 %
RETICS AGGREG/RBC NFR: 73.2 K/UL
SPECIFIC GRAVITY URINE: 1.02
UROBILINOGEN URINE: ABNORMAL
VIT B12 SERPL-MCNC: 426 PG/ML
WBC # FLD AUTO: 7.44 K/UL

## 2021-10-08 RX ORDER — SULFAMETHOXAZOLE AND TRIMETHOPRIM 800; 160 MG/1; MG/1
800-160 TABLET ORAL
Qty: 10 | Refills: 0 | Status: ACTIVE | COMMUNITY
Start: 2021-10-08 | End: 1900-01-01

## 2022-07-07 ENCOUNTER — APPOINTMENT (OUTPATIENT)
Dept: HEMATOLOGY ONCOLOGY | Facility: CLINIC | Age: 77
End: 2022-07-07

## 2022-07-27 ENCOUNTER — APPOINTMENT (OUTPATIENT)
Dept: OTOLARYNGOLOGY | Facility: CLINIC | Age: 77
End: 2022-07-27

## 2022-07-27 VITALS — BODY MASS INDEX: 25.43 KG/M2 | WEIGHT: 162 LBS | HEIGHT: 67 IN

## 2022-07-27 DIAGNOSIS — R22.0 LOCALIZED SWELLING, MASS AND LUMP, HEAD: ICD-10-CM

## 2022-07-27 PROCEDURE — 31575 DIAGNOSTIC LARYNGOSCOPY: CPT

## 2022-07-27 PROCEDURE — 99204 OFFICE O/P NEW MOD 45 MIN: CPT | Mod: 25

## 2022-07-27 RX ORDER — FLUTICASONE PROPIONATE 50 UG/1
50 SPRAY, METERED NASAL DAILY
Qty: 1 | Refills: 6 | Status: ACTIVE | COMMUNITY
Start: 2022-07-27 | End: 1900-01-01

## 2022-07-27 NOTE — PROCEDURE
[Recalcitrant Symptoms] : recalcitrant symptoms  [Congested] : congested [Lisbet] : lisbet [Flexible Endoscope] : examined with the flexible endoscope [Normal] : posterior cricoid area had healthy pink mucosa in the interarytenoid area and the esophageal inlet

## 2022-07-27 NOTE — ASSESSMENT
[FreeTextEntry1] : Pt will proceed with uvulectomy after discussing risks benefits and alternatives. \par \par Pt would like to proceed with uvulectomy. Risks, benefits and alternatives were explained to the patient. They included but were not limited to bleeding, infection, persistent symptoms, dehydration, numbness, change in voice, change in swallowing, need for additional surgery, etc.. All questions were answered at this time.\par

## 2022-07-27 NOTE — HISTORY OF PRESENT ILLNESS
[de-identified] : Patient presents today c/o snoring.   Her    is concern due to snoring .   Was  on  prednisone  from [id number],  she did gain weight while on them .  Noticed  that she  snores  when exhausted.  Her    has  noticed any abnormal  breathing. She is  snores loudly. \par H/o SNHL- not interested in hearing aids, CI or repeating  today. She is doing fine with liip reading.

## 2022-07-27 NOTE — PHYSICAL EXAM
[Normal] : lingual tonsils are normal [Midline] : trachea located in midline position [de-identified] : edema  [de-identified] : uvular hyeprtrophy

## 2022-08-25 ENCOUNTER — APPOINTMENT (OUTPATIENT)
Dept: OTOLARYNGOLOGY | Facility: CLINIC | Age: 77
End: 2022-08-25

## 2022-08-25 PROCEDURE — 42140 EXCISION OF UVULA: CPT

## 2022-08-31 LAB — CORE LAB BIOPSY: NORMAL

## 2022-09-22 ENCOUNTER — APPOINTMENT (OUTPATIENT)
Dept: OBGYN | Facility: CLINIC | Age: 77
End: 2022-09-22

## 2022-09-22 VITALS
DIASTOLIC BLOOD PRESSURE: 61 MMHG | WEIGHT: 161 LBS | HEIGHT: 67 IN | SYSTOLIC BLOOD PRESSURE: 128 MMHG | BODY MASS INDEX: 25.27 KG/M2

## 2022-09-22 DIAGNOSIS — Z01.419 ENCOUNTER FOR GYNECOLOGICAL EXAMINATION (GENERAL) (ROUTINE) W/OUT ABNORMAL FINDINGS: ICD-10-CM

## 2022-09-22 PROCEDURE — G0101: CPT

## 2022-10-19 ENCOUNTER — APPOINTMENT (OUTPATIENT)
Dept: OTOLARYNGOLOGY | Facility: CLINIC | Age: 77
End: 2022-10-19

## 2022-10-19 DIAGNOSIS — K13.79 OTHER LESIONS OF ORAL MUCOSA: ICD-10-CM

## 2022-10-19 DIAGNOSIS — R06.83 SNORING: ICD-10-CM

## 2022-10-19 PROCEDURE — 99024 POSTOP FOLLOW-UP VISIT: CPT

## 2022-10-19 NOTE — HISTORY OF PRESENT ILLNESS
[FreeTextEntry1] : Patient here s/p uvulectomy 08/25/22. She is doing well  since procedure ,  she  denies any complication afterwards.

## 2022-11-07 ENCOUNTER — APPOINTMENT (OUTPATIENT)
Dept: HEMATOLOGY ONCOLOGY | Facility: CLINIC | Age: 77
End: 2022-11-07

## 2022-11-07 ENCOUNTER — OUTPATIENT (OUTPATIENT)
Dept: OUTPATIENT SERVICES | Facility: HOSPITAL | Age: 77
LOS: 1 days | End: 2022-11-07

## 2022-11-07 VITALS
WEIGHT: 164 LBS | HEART RATE: 92 BPM | RESPIRATION RATE: 20 BRPM | OXYGEN SATURATION: 98 % | TEMPERATURE: 97.2 F | SYSTOLIC BLOOD PRESSURE: 116 MMHG | DIASTOLIC BLOOD PRESSURE: 66 MMHG | HEIGHT: 67 IN | BODY MASS INDEX: 25.74 KG/M2

## 2022-11-07 LAB
BASOPHILS # BLD AUTO: 0.13 K/UL
BASOPHILS NFR BLD AUTO: 1.3 %
EOSINOPHIL # BLD AUTO: 0.12 K/UL
EOSINOPHIL NFR BLD AUTO: 1.2 %
HCT VFR BLD CALC: 39.1 %
HGB BLD-MCNC: 13.1 G/DL
IMM GRANULOCYTES NFR BLD AUTO: 0.9 %
LYMPHOCYTES # BLD AUTO: 1.99 K/UL
LYMPHOCYTES NFR BLD AUTO: 19.5 %
MAN DIFF?: NORMAL
MCHC RBC-ENTMCNC: 30.4 PG
MCHC RBC-ENTMCNC: 33.5 G/DL
MCV RBC AUTO: 90.7 FL
MONOCYTES # BLD AUTO: 0.8 K/UL
MONOCYTES NFR BLD AUTO: 7.8 %
NEUTROPHILS # BLD AUTO: 7.09 K/UL
NEUTROPHILS NFR BLD AUTO: 69.3 %
PLATELET # BLD AUTO: 196 K/UL
RBC # BLD: 4.31 M/UL
RBC # FLD: 12.9 %
WBC # FLD AUTO: 10.22 K/UL

## 2022-11-07 PROCEDURE — 99213 OFFICE O/P EST LOW 20 MIN: CPT

## 2022-11-08 DIAGNOSIS — D69.6 THROMBOCYTOPENIA, UNSPECIFIED: ICD-10-CM

## 2022-11-08 LAB
ALBUMIN SERPL ELPH-MCNC: 4.5 G/DL
ALP BLD-CCNC: 86 U/L
ALT SERPL-CCNC: 16 U/L
ANION GAP SERPL CALC-SCNC: 9 MMOL/L
AST SERPL-CCNC: 18 U/L
BILIRUB SERPL-MCNC: 0.8 MG/DL
BUN SERPL-MCNC: 29 MG/DL
CALCIUM SERPL-MCNC: 9.2 MG/DL
CHLORIDE SERPL-SCNC: 106 MMOL/L
CO2 SERPL-SCNC: 25 MMOL/L
CREAT SERPL-MCNC: 1.1 MG/DL
EGFR: 52 ML/MIN/1.73M2
GLUCOSE SERPL-MCNC: 88 MG/DL
LDH SERPL-CCNC: 180
POTASSIUM SERPL-SCNC: 4.3 MMOL/L
PROT SERPL-MCNC: 6.3 G/DL
SODIUM SERPL-SCNC: 140 MMOL/L

## 2022-11-08 NOTE — HISTORY OF PRESENT ILLNESS
[de-identified] : 07/12/2018 : Patient returns for follow up after 2 courses of pulse decadron 20mg X 4. She complained of insomnia on the medication , she had no response to the first cycle and required Iv IG with good response. She denies any abnormal bleeding at this time. \par 09/06/2018 : patient returns for follow up , she is on prednisone 10mg daily and is frustrated with her inability to lose weight with diet and exercise. She is otherwise asymptomatic. \par \par 12/06/2018 Patient returns for follow up , she is currently on prednisone 5 mg , her weight is back to baseline , she continues on dyazide every other day for edema, she denies any abnormal bleeding , .\par \par 01/03/2019 Patient returns for follow up , she is on prednisone 5mg every other day , recent blood work showed K of 3.3 . she continues on dyazide every other day for edema . SHe is back to her baseline weight . She had three nosebleeds last month .\par \par 05/30/2019 Patient returns for follow up , she continues on prednisone 10mg daily and reports weight gain of 10 lbs . she denies any abnormal bleeding . She feels swollen after she stopped the diuretic and started on losartan 50mg for hypertension . She reports occasional orthostatic dizziness. \par \par 09/10/2019 Patient returns for follow up , she is on prednisone 5 mg alternating with 2.5 mg daily . she complains of generalized bruising including large echymosis on right shoulder , noted after colonoscopy and is possibly traumatic . platelet :45 . She has been on steroids for nearly 18 months and is " fed up " from the side effects including mood and weight fluctuations , hypertension . . \par \par 11/25/2019 Patient returns for follow up , she continues on prednisone 10mg daily . She denies any abnormal bleeding , leg swelling . \par \par 12/23/2019 Patient returns for follow up , she complains only of mild fatigue . CBC last week at Dr Gray showed Hb : 11.8 , platelets : 82 .\par \par 07/12/2021 Patient returns for interval follow up , she continues on prednisone 2.5 mg every other day . She started on sublingual B12 while in [place] for mild anemia and low B12 ( serology for pernicious anemia was allegedly negative ) , last Hgb in May was back to normal . She denies anty abnormal bleeding . \par \par 10/7/2021 Patient returns for history of ITP . She reports dark colored urine for one day and thinks she has UTI . no other abnormal bleeding . she was was seen recently by Dr Gray and asked to stop losartan due to increased Cr , renal sono was negative in 7?2021 . \par \par 11/7/22: Patient returns for ITP s/p rituxan and steroids. She was last on steroids in July 2021, was on for 3 years. Last bloodwork from 5/2022 showed platelet 145, WBC 6.2, hg 12.6. CBC from today pending. She has black discoloration under her first toenail. Started about 3 weeks ago and had been growing, now stable. She is otherwise doing well. She already follows with podiatry, recommended to f/u ASAP for toenail discoloration.  She also had uvula surgery since the last visit because it was enlarged and obstructing her sleep.  [de-identified] : 72 year old female referred by Dr Farzad Gary for thrombocytopenia . She was seen in this office 8 years ago for mild thrombocytopenia , work up is unavailable , she was lost for follow up and had uncomplicated gallbladder surgery in 2012, carpal tunnel surgery in November 2017 . platelet count was 174 in JUNE 2017 and 39 in November 2017 prior to carpal tunnel surgery . Most recent platelet count was 19 on June 8 . She has noted mild easy bruising on her chest and back , no mucosal bleeding . no new meds ASA or vitamins, Last colonoscopy 4 years ago for polyps, mammogram in August 2017 , she reports intentional weight loss of 30 lbs with diet . no fever , night sweats, rash , joint pains, he reports slight fatigue recently . \par

## 2022-11-08 NOTE — PHYSICAL EXAM
[Normal] : normoactive bowel sounds, soft and nontender, no hepatosplenomegaly or masses appreciated [Thrush] : no thrush [de-identified] : no edema [de-identified] : large toenail black discoloration; no bruising or petechiae . .

## 2022-11-08 NOTE — ASSESSMENT
[FreeTextEntry1] : 75 year old female with history of hypothyroidism  ( Hashimoto's ?) on replacement , Chronic ITP s/p rituxan X 4 (10/2019), off steroid  - Platelets are normal . \par Suspected b12 deficiency, on SL b12, every 3 days\par \par Plan: Repeat bloodwork in 6 months\par          Podiatry followup ASAP, hematoma vs ungual malignancy?\par

## 2022-12-06 ENCOUNTER — NON-APPOINTMENT (OUTPATIENT)
Age: 77
End: 2022-12-06

## 2023-06-12 ENCOUNTER — APPOINTMENT (OUTPATIENT)
Dept: HEMATOLOGY ONCOLOGY | Facility: CLINIC | Age: 78
End: 2023-06-12

## 2023-07-27 ENCOUNTER — OUTPATIENT (OUTPATIENT)
Dept: OUTPATIENT SERVICES | Facility: HOSPITAL | Age: 78
LOS: 1 days | End: 2023-07-27
Payer: MEDICARE

## 2023-07-27 ENCOUNTER — APPOINTMENT (OUTPATIENT)
Dept: HEMATOLOGY ONCOLOGY | Facility: CLINIC | Age: 78
End: 2023-07-27
Payer: MEDICARE

## 2023-07-27 ENCOUNTER — LABORATORY RESULT (OUTPATIENT)
Age: 78
End: 2023-07-27

## 2023-07-27 VITALS
OXYGEN SATURATION: 98 % | DIASTOLIC BLOOD PRESSURE: 66 MMHG | HEART RATE: 79 BPM | WEIGHT: 162 LBS | RESPIRATION RATE: 14 BRPM | TEMPERATURE: 97.2 F | SYSTOLIC BLOOD PRESSURE: 158 MMHG | BODY MASS INDEX: 25.43 KG/M2 | HEIGHT: 67 IN

## 2023-07-27 DIAGNOSIS — D69.6 THROMBOCYTOPENIA, UNSPECIFIED: ICD-10-CM

## 2023-07-27 LAB
HCT VFR BLD CALC: 36.5 %
HGB BLD-MCNC: 12.2 G/DL
MCHC RBC-ENTMCNC: 30.2 PG
MCHC RBC-ENTMCNC: 33.4 G/DL
MCV RBC AUTO: 90.3 FL
PLATELET # BLD AUTO: 152 K/UL
PMV BLD: 9.2 FL
RBC # BLD: 4.04 M/UL
RBC # FLD: 12.8 %
WBC # FLD AUTO: 8.78 K/UL

## 2023-07-27 PROCEDURE — 99213 OFFICE O/P EST LOW 20 MIN: CPT

## 2023-07-27 PROCEDURE — 80053 COMPREHEN METABOLIC PANEL: CPT

## 2023-07-27 PROCEDURE — 85027 COMPLETE CBC AUTOMATED: CPT

## 2023-07-27 NOTE — HISTORY OF PRESENT ILLNESS
[de-identified] : 07/12/2018 : Patient returns for follow up after 2 courses of pulse decadron 20mg X 4. She complained of insomnia on the medication , she had no response to the first cycle and required Iv IG with good response. She denies any abnormal bleeding at this time. \par 09/06/2018 : patient returns for follow up , she is on prednisone 10mg daily and is frustrated with her inability to lose weight with diet and exercise. She is otherwise asymptomatic. \par \par 12/06/2018 Patient returns for follow up , she is currently on prednisone 5 mg , her weight is back to baseline , she continues on dyazide every other day for edema, she denies any abnormal bleeding , .\par \par 01/03/2019 Patient returns for follow up , she is on prednisone 5mg every other day , recent blood work showed K of 3.3 . she continues on dyazide every other day for edema . SHe is back to her baseline weight . She had three nosebleeds last month .\par \par 05/30/2019 Patient returns for follow up , she continues on prednisone 10mg daily and reports weight gain of 10 lbs . she denies any abnormal bleeding . She feels swollen after she stopped the diuretic and started on losartan 50mg for hypertension . She reports occasional orthostatic dizziness. \par \par 09/10/2019 Patient returns for follow up , she is on prednisone 5 mg alternating with 2.5 mg daily . she complains of generalized bruising including large echymosis on right shoulder , noted after colonoscopy and is possibly traumatic . platelet :45 . She has been on steroids for nearly 18 months and is " fed up " from the side effects including mood and weight fluctuations , hypertension . . \par \par 11/25/2019 Patient returns for follow up , she continues on prednisone 10mg daily . She denies any abnormal bleeding , leg swelling . \par \par 12/23/2019 Patient returns for follow up , she complains only of mild fatigue . CBC last week at Dr Gray showed Hb : 11.8 , platelets : 82 .\par \par 07/12/2021 Patient returns for interval follow up , she continues on prednisone 2.5 mg every other day . She started on sublingual B12 while in [place] for mild anemia and low B12 ( serology for pernicious anemia was allegedly negative ) , last Hgb in May was back to normal . She denies anty abnormal bleeding . \par \par 10/7/2021 Patient returns for history of ITP . She reports dark colored urine for one day and thinks she has UTI . no other abnormal bleeding . she was was seen recently by Dr Gray and asked to stop losartan due to increased Cr , renal sono was negative in 7?2021 . \par \par

## 2023-07-28 DIAGNOSIS — D69.6 THROMBOCYTOPENIA, UNSPECIFIED: ICD-10-CM

## 2023-07-28 LAB
ALBUMIN SERPL ELPH-MCNC: 4.4 G/DL
ALP BLD-CCNC: 79 U/L
ALT SERPL-CCNC: 17 U/L
ANION GAP SERPL CALC-SCNC: 11 MMOL/L
AST SERPL-CCNC: 20 U/L
BILIRUB SERPL-MCNC: 0.6 MG/DL
BUN SERPL-MCNC: 21 MG/DL
CALCIUM SERPL-MCNC: 9.8 MG/DL
CHLORIDE SERPL-SCNC: 108 MMOL/L
CO2 SERPL-SCNC: 23 MMOL/L
CREAT SERPL-MCNC: 1.1 MG/DL
EGFR: 52 ML/MIN/1.73M2
GLUCOSE SERPL-MCNC: 82 MG/DL
POTASSIUM SERPL-SCNC: 4.2 MMOL/L
PROT SERPL-MCNC: 6.1 G/DL
SODIUM SERPL-SCNC: 142 MMOL/L

## 2023-07-28 NOTE — PHYSICAL EXAM
[Normal] : normoactive bowel sounds, soft and nontender, no hepatosplenomegaly or masses appreciated [Thrush] : no thrush [de-identified] : no edema [de-identified] : no bruising or petechiae . .

## 2023-07-28 NOTE — HISTORY OF PRESENT ILLNESS
[de-identified] : 07/12/2018 : Patient returns for follow up after 2 courses of pulse decadron 20mg X 4. She complained of insomnia on the medication , she had no response to the first cycle and required Iv IG with good response. She denies any abnormal bleeding at this time. \par 09/06/2018 : patient returns for follow up , she is on prednisone 10mg daily and is frustrated with her inability to lose weight with diet and exercise. She is otherwise asymptomatic. \par \par 12/06/2018 Patient returns for follow up , she is currently on prednisone 5 mg , her weight is back to baseline , she continues on dyazide every other day for edema, she denies any abnormal bleeding , .\par \par 01/03/2019 Patient returns for follow up , she is on prednisone 5mg every other day , recent blood work showed K of 3.3 . she continues on dyazide every other day for edema . SHe is back to her baseline weight . She had three nosebleeds last month .\par \par 05/30/2019 Patient returns for follow up , she continues on prednisone 10mg daily and reports weight gain of 10 lbs . she denies any abnormal bleeding . She feels swollen after she stopped the diuretic and started on losartan 50mg for hypertension . She reports occasional orthostatic dizziness. \par \par 09/10/2019 Patient returns for follow up , she is on prednisone 5 mg alternating with 2.5 mg daily . she complains of generalized bruising including large echymosis on right shoulder , noted after colonoscopy and is possibly traumatic . platelet :45 . She has been on steroids for nearly 18 months and is " fed up " from the side effects including mood and weight fluctuations , hypertension . . \par \par 11/25/2019 Patient returns for follow up , she continues on prednisone 10mg daily . She denies any abnormal bleeding , leg swelling . \par \par 12/23/2019 Patient returns for follow up , she complains only of mild fatigue . CBC last week at Dr Gray showed Hb : 11.8 , platelets : 82 .\par \par 07/12/2021 Patient returns for interval follow up , she continues on prednisone 2.5 mg every other day . She started on sublingual B12 while in [place] for mild anemia and low B12 ( serology for pernicious anemia was allegedly negative ) , last Hgb in May was back to normal . She denies anty abnormal bleeding . \par \par 10/7/2021 Patient returns for history of ITP . She reports dark colored urine for one day and thinks she has UTI . no other abnormal bleeding . she was was seen recently by Dr Gray and asked to stop losartan due to increased Cr , renal sono was negative in 7?2021 . \par \par \par 7/27/2023 Patient returns for follow p for ITP , she feels well except for occasional easy bruising , no mucosal bleeding . last platelets were 120 in May while in [place]. \par She denies any B symptoms , arthralgias. \par

## 2023-07-28 NOTE — ASSESSMENT
[FreeTextEntry1] : 75 year old female with history of hypothyroidism  ( Hashimoto's ?) on replacement , Chronic ITP s/p rituxan X 4 (10/2019), off steroid .\par Suspected B12 deficiency responding to sublingual replacement , every 3 days . \par \par Mild easy bruising .\par CBC :

## 2023-10-23 ENCOUNTER — OUTPATIENT (OUTPATIENT)
Dept: OUTPATIENT SERVICES | Facility: HOSPITAL | Age: 78
LOS: 1 days | End: 2023-10-23
Payer: MEDICARE

## 2023-10-23 ENCOUNTER — APPOINTMENT (OUTPATIENT)
Dept: HEMATOLOGY ONCOLOGY | Facility: CLINIC | Age: 78
End: 2023-10-23
Payer: MEDICARE

## 2023-10-23 ENCOUNTER — LABORATORY RESULT (OUTPATIENT)
Age: 78
End: 2023-10-23

## 2023-10-23 DIAGNOSIS — D69.6 THROMBOCYTOPENIA, UNSPECIFIED: ICD-10-CM

## 2023-10-23 LAB
HCT VFR BLD CALC: 35.8 %
HGB BLD-MCNC: 12.2 G/DL
MCHC RBC-ENTMCNC: 29.8 PG
MCHC RBC-ENTMCNC: 34.1 G/DL
MCV RBC AUTO: 87.3 FL
PLATELET # BLD AUTO: 133 K/UL
PMV BLD: 10 FL
RBC # BLD: 4.1 M/UL
RBC # FLD: 13.2 %
WBC # FLD AUTO: 5.77 K/UL

## 2023-10-23 PROCEDURE — 99212 OFFICE O/P EST SF 10 MIN: CPT

## 2023-10-23 PROCEDURE — 85027 COMPLETE CBC AUTOMATED: CPT

## 2024-01-04 ENCOUNTER — OUTPATIENT (OUTPATIENT)
Dept: OUTPATIENT SERVICES | Facility: HOSPITAL | Age: 79
LOS: 1 days | End: 2024-01-04
Payer: MEDICARE

## 2024-01-04 ENCOUNTER — LABORATORY RESULT (OUTPATIENT)
Age: 79
End: 2024-01-04

## 2024-01-04 ENCOUNTER — APPOINTMENT (OUTPATIENT)
Dept: HEMATOLOGY ONCOLOGY | Facility: CLINIC | Age: 79
End: 2024-01-04
Payer: MEDICARE

## 2024-01-04 VITALS
SYSTOLIC BLOOD PRESSURE: 161 MMHG | HEART RATE: 69 BPM | BODY MASS INDEX: 25.43 KG/M2 | WEIGHT: 162 LBS | HEIGHT: 67 IN | TEMPERATURE: 97.6 F | DIASTOLIC BLOOD PRESSURE: 91 MMHG | RESPIRATION RATE: 16 BRPM

## 2024-01-04 DIAGNOSIS — D69.6 THROMBOCYTOPENIA, UNSPECIFIED: ICD-10-CM

## 2024-01-04 LAB
HCT VFR BLD CALC: 36.2 %
HGB BLD-MCNC: 12.9 G/DL
MCHC RBC-ENTMCNC: 31.2 PG
MCHC RBC-ENTMCNC: 35.6 G/DL
MCV RBC AUTO: 87.4 FL
PLATELET # BLD AUTO: 82 K/UL
PMV BLD: 10.1 FL
RBC # BLD: 4.14 M/UL
RBC # FLD: 12.4 %
WBC # FLD AUTO: 7.53 K/UL

## 2024-01-04 PROCEDURE — 99213 OFFICE O/P EST LOW 20 MIN: CPT

## 2024-01-04 PROCEDURE — 85027 COMPLETE CBC AUTOMATED: CPT

## 2024-01-05 NOTE — HISTORY OF PRESENT ILLNESS
[de-identified] : 07/12/2018 : Patient returns for follow up after 2 courses of pulse decadron 20mg X 4. She complained of insomnia on the medication , she had no response to the first cycle and required Iv IG with good response. She denies any abnormal bleeding at this time.  09/06/2018 : patient returns for follow up , she is on prednisone 10mg daily and is frustrated with her inability to lose weight with diet and exercise. She is otherwise asymptomatic.   12/06/2018 Patient returns for follow up , she is currently on prednisone 5 mg , her weight is back to baseline , she continues on dyazide every other day for edema, she denies any abnormal bleeding , .  01/03/2019 Patient returns for follow up , she is on prednisone 5mg every other day , recent blood work showed K of 3.3 . she continues on dyazide every other day for edema . SHe is back to her baseline weight . She had three nosebleeds last month .  05/30/2019 Patient returns for follow up , she continues on prednisone 10mg daily and reports weight gain of 10 lbs . she denies any abnormal bleeding . She feels swollen after she stopped the diuretic and started on losartan 50mg for hypertension . She reports occasional orthostatic dizziness.   09/10/2019 Patient returns for follow up , she is on prednisone 5 mg alternating with 2.5 mg daily . she complains of generalized bruising including large echymosis on right shoulder , noted after colonoscopy and is possibly traumatic . platelet :45 . She has been on steroids for nearly 18 months and is " fed up " from the side effects including mood and weight fluctuations , hypertension . .   11/25/2019 Patient returns for follow up , she continues on prednisone 10mg daily . She denies any abnormal bleeding , leg swelling .   12/23/2019 Patient returns for follow up , she complains only of mild fatigue . CBC last week at Dr Gray showed Hb : 11.8 , platelets : 82 .  07/12/2021 Patient returns for interval follow up , she continues on prednisone 2.5 mg every other day . She started on sublingual B12 while in [place] for mild anemia and low B12 ( serology for pernicious anemia was allegedly negative ) , last Hgb in May was back to normal . She denies anty abnormal bleeding .   10/7/2021 Patient returns for history of ITP . She reports dark colored urine for one day and thinks she has UTI . no other abnormal bleeding . she was was seen recently by Dr Gray and asked to stop losartan due to increased Cr , renal sono was negative in 7?2021 .    7/27/2023 Patient returns for follow p for ITP , she feels well except for occasional easy bruising , no mucosal bleeding . last platelets were 120 in May while in [place].  She denies any B symptoms , arthralgias.    1/4/2024 Patient returns for history of ITP , she has a small left subconjunctival hemorrhage ,no other abnormal bleeding . last platelets 120 in December .

## 2024-01-05 NOTE — ASSESSMENT
[FreeTextEntry1] : History of chronic ITP  platelets trending down ? asymptomatic  Recommended repeat cbc in 2 weeks , sooner for any new bleeding symptoms  will follow with local hematologist in [place] in 2 to 3 weeks .  Also planning elective left knee arthroplasty .

## 2024-01-05 NOTE — PHYSICAL EXAM
[Normal] : normoactive bowel sounds, soft and nontender, no hepatosplenomegaly or masses appreciated [Thrush] : no thrush [de-identified] : small conjunctival hematoma [de-identified] : no edema [de-identified] : no bruising or petechiae . .

## 2024-01-18 ENCOUNTER — APPOINTMENT (OUTPATIENT)
Dept: HEMATOLOGY ONCOLOGY | Facility: CLINIC | Age: 79
End: 2024-01-18
Payer: MEDICARE

## 2024-01-18 ENCOUNTER — OUTPATIENT (OUTPATIENT)
Dept: OUTPATIENT SERVICES | Facility: HOSPITAL | Age: 79
LOS: 1 days | End: 2024-01-18
Payer: MEDICARE

## 2024-01-18 ENCOUNTER — LABORATORY RESULT (OUTPATIENT)
Age: 79
End: 2024-01-18

## 2024-01-18 ENCOUNTER — APPOINTMENT (OUTPATIENT)
Dept: HEMATOLOGY ONCOLOGY | Facility: CLINIC | Age: 79
End: 2024-01-18

## 2024-01-18 VITALS
OXYGEN SATURATION: 99 % | RESPIRATION RATE: 16 BRPM | SYSTOLIC BLOOD PRESSURE: 128 MMHG | WEIGHT: 155 LBS | HEART RATE: 80 BPM | HEIGHT: 67 IN | BODY MASS INDEX: 24.33 KG/M2 | DIASTOLIC BLOOD PRESSURE: 65 MMHG

## 2024-01-18 DIAGNOSIS — D69.6 THROMBOCYTOPENIA, UNSPECIFIED: ICD-10-CM

## 2024-01-18 LAB
ALBUMIN SERPL ELPH-MCNC: 4.4 G/DL
ALP BLD-CCNC: 93 U/L
ALT SERPL-CCNC: 13 U/L
ANION GAP SERPL CALC-SCNC: 13 MMOL/L
AST SERPL-CCNC: 18 U/L
BILIRUB SERPL-MCNC: 0.7 MG/DL
BUN SERPL-MCNC: 30 MG/DL
CALCIUM SERPL-MCNC: 9.7 MG/DL
CHLORIDE SERPL-SCNC: 109 MMOL/L
CO2 SERPL-SCNC: 21 MMOL/L
CREAT SERPL-MCNC: 1 MG/DL
EGFR: 58 ML/MIN/1.73M2
GLUCOSE SERPL-MCNC: 103 MG/DL
HCT VFR BLD CALC: 37.1 %
HGB BLD-MCNC: 12.9 G/DL
MCHC RBC-ENTMCNC: 29.9 PG
MCHC RBC-ENTMCNC: 34.8 G/DL
MCV RBC AUTO: 86.1 FL
PLATELET # BLD AUTO: 69 K/UL
PMV BLD: 10.8 FL
POTASSIUM SERPL-SCNC: 4.4 MMOL/L
PROT SERPL-MCNC: 6.3 G/DL
RBC # BLD: 4.31 M/UL
RBC # FLD: 11.9 %
SODIUM SERPL-SCNC: 143 MMOL/L
WBC # FLD AUTO: 5.84 K/UL

## 2024-01-18 PROCEDURE — G2211 COMPLEX E/M VISIT ADD ON: CPT

## 2024-01-18 PROCEDURE — 80053 COMPREHEN METABOLIC PANEL: CPT

## 2024-01-18 PROCEDURE — 99213 OFFICE O/P EST LOW 20 MIN: CPT

## 2024-01-18 PROCEDURE — 36415 COLL VENOUS BLD VENIPUNCTURE: CPT

## 2024-01-18 PROCEDURE — 85027 COMPLETE CBC AUTOMATED: CPT

## 2024-01-19 DIAGNOSIS — D69.6 THROMBOCYTOPENIA, UNSPECIFIED: ICD-10-CM

## 2024-01-19 NOTE — HISTORY OF PRESENT ILLNESS
[de-identified] : 07/12/2018 : Patient returns for follow up after 2 courses of pulse decadron 20mg X 4. She complained of insomnia on the medication , she had no response to the first cycle and required Iv IG with good response. She denies any abnormal bleeding at this time.  09/06/2018 : patient returns for follow up , she is on prednisone 10mg daily and is frustrated with her inability to lose weight with diet and exercise. She is otherwise asymptomatic.   12/06/2018 Patient returns for follow up , she is currently on prednisone 5 mg , her weight is back to baseline , she continues on dyazide every other day for edema, she denies any abnormal bleeding , .  01/03/2019 Patient returns for follow up , she is on prednisone 5mg every other day , recent blood work showed K of 3.3 . she continues on dyazide every other day for edema . SHe is back to her baseline weight . She had three nosebleeds last month .  05/30/2019 Patient returns for follow up , she continues on prednisone 10mg daily and reports weight gain of 10 lbs . she denies any abnormal bleeding . She feels swollen after she stopped the diuretic and started on losartan 50mg for hypertension . She reports occasional orthostatic dizziness.   09/10/2019 Patient returns for follow up , she is on prednisone 5 mg alternating with 2.5 mg daily . she complains of generalized bruising including large echymosis on right shoulder , noted after colonoscopy and is possibly traumatic . platelet :45 . She has been on steroids for nearly 18 months and is " fed up " from the side effects including mood and weight fluctuations , hypertension . .   11/25/2019 Patient returns for follow up , she continues on prednisone 10mg daily . She denies any abnormal bleeding , leg swelling .   12/23/2019 Patient returns for follow up , she complains only of mild fatigue . CBC last week at Dr Gray showed Hb : 11.8 , platelets : 82 .  07/12/2021 Patient returns for interval follow up , she continues on prednisone 2.5 mg every other day . She started on sublingual B12 while in [place] for mild anemia and low B12 ( serology for pernicious anemia was allegedly negative ) , last Hgb in May was back to normal . She denies anty abnormal bleeding .   10/7/2021 Patient returns for history of ITP . She reports dark colored urine for one day and thinks she has UTI . no other abnormal bleeding . she was was seen recently by Dr Gray and asked to stop losartan due to increased Cr , renal sono was negative in 7?2021 .    7/27/2023 Patient returns for follow p for ITP , she feels well except for occasional easy bruising , no mucosal bleeding . last platelets were 120 in May while in [place].  She denies any B symptoms , arthralgias.    1/4/2024 Patient returns for history of ITP , she has a small left subconjunctival hemorrhage ,no other abnormal bleeding . last platelets 120 in December .   1/18/24: Patient returns for follow up for history of ITP. She feels well today and denies any bruising/bleeding. She is planning on going to [place] in a few weeks and also has alocal hematologist down there that she follows up with.

## 2024-01-19 NOTE — PHYSICAL EXAM
[Normal] : normoactive bowel sounds, soft and nontender, no hepatosplenomegaly or masses appreciated [Thrush] : no thrush [de-identified] : small conjunctival hematoma [de-identified] : no edema [de-identified] : no bruising or petechiae . .

## 2024-01-19 NOTE — ASSESSMENT
[FreeTextEntry1] : 78 year old female with history of chronic ITP, s/p Rituxan x 4 in 2019 and IVIG Intolerant to PO steroids   Platelets now trending down without evidence of bleeding/bruising  CBC today shows plt count of 69,000  PLAN:  --Will repeat CBC in 2 weeks and continue monitoring for signs of bleeding/bruising  --Discussed possible treatment with Promacta vs Rituxan if plts continue to decrease  --Will check CBC in 2 weeks and also draw Hep B/C panel  --Patient is planning to go to [place] in a few weeks, advised that she will likely need to monitor her CBC while there (follows with a hematologist down there as well)   Repeat CBC in 2 weeks  Patient was seen and examined and discussed with Dr. Cade who agrees to the above plan of care.

## 2024-02-05 ENCOUNTER — LABORATORY RESULT (OUTPATIENT)
Age: 79
End: 2024-02-05

## 2024-02-05 ENCOUNTER — APPOINTMENT (OUTPATIENT)
Dept: HEMATOLOGY ONCOLOGY | Facility: CLINIC | Age: 79
End: 2024-02-05
Payer: MEDICARE

## 2024-02-05 ENCOUNTER — OUTPATIENT (OUTPATIENT)
Dept: OUTPATIENT SERVICES | Facility: HOSPITAL | Age: 79
LOS: 1 days | End: 2024-02-05
Payer: MEDICARE

## 2024-02-05 VITALS
HEART RATE: 79 BPM | TEMPERATURE: 98.4 F | HEIGHT: 67 IN | WEIGHT: 156 LBS | SYSTOLIC BLOOD PRESSURE: 122 MMHG | RESPIRATION RATE: 16 BRPM | DIASTOLIC BLOOD PRESSURE: 64 MMHG | BODY MASS INDEX: 24.48 KG/M2 | OXYGEN SATURATION: 98 %

## 2024-02-05 DIAGNOSIS — D69.6 THROMBOCYTOPENIA, UNSPECIFIED: ICD-10-CM

## 2024-02-05 LAB
HCT VFR BLD CALC: 36.8 %
HGB BLD-MCNC: 12.7 G/DL
MCHC RBC-ENTMCNC: 30.5 PG
MCHC RBC-ENTMCNC: 34.5 G/DL
MCV RBC AUTO: 88.2 FL
PLATELET # BLD AUTO: 68 K/UL
PMV BLD: 10 FL
RBC # BLD: 4.17 M/UL
RBC # FLD: 12.5 %
WBC # FLD AUTO: 6.13 K/UL

## 2024-02-05 PROCEDURE — 87340 HEPATITIS B SURFACE AG IA: CPT

## 2024-02-05 PROCEDURE — 87522 HEPATITIS C REVRS TRNSCRPJ: CPT

## 2024-02-05 PROCEDURE — 86704 HEP B CORE ANTIBODY TOTAL: CPT

## 2024-02-05 PROCEDURE — 86706 HEP B SURFACE ANTIBODY: CPT

## 2024-02-05 PROCEDURE — 85027 COMPLETE CBC AUTOMATED: CPT

## 2024-02-05 PROCEDURE — 99213 OFFICE O/P EST LOW 20 MIN: CPT

## 2024-02-05 NOTE — HISTORY OF PRESENT ILLNESS
[de-identified] : 07/12/2018 : Patient returns for follow up after 2 courses of pulse decadron 20mg X 4. She complained of insomnia on the medication , she had no response to the first cycle and required Iv IG with good response. She denies any abnormal bleeding at this time.  09/06/2018 : patient returns for follow up , she is on prednisone 10mg daily and is frustrated with her inability to lose weight with diet and exercise. She is otherwise asymptomatic.   12/06/2018 Patient returns for follow up , she is currently on prednisone 5 mg , her weight is back to baseline , she continues on dyazide every other day for edema, she denies any abnormal bleeding , .  01/03/2019 Patient returns for follow up , she is on prednisone 5mg every other day , recent blood work showed K of 3.3 . she continues on dyazide every other day for edema . SHe is back to her baseline weight . She had three nosebleeds last month .  05/30/2019 Patient returns for follow up , she continues on prednisone 10mg daily and reports weight gain of 10 lbs . she denies any abnormal bleeding . She feels swollen after she stopped the diuretic and started on losartan 50mg for hypertension . She reports occasional orthostatic dizziness.   09/10/2019 Patient returns for follow up , she is on prednisone 5 mg alternating with 2.5 mg daily . she complains of generalized bruising including large echymosis on right shoulder , noted after colonoscopy and is possibly traumatic . platelet :45 . She has been on steroids for nearly 18 months and is " fed up " from the side effects including mood and weight fluctuations , hypertension . .   11/25/2019 Patient returns for follow up , she continues on prednisone 10mg daily . She denies any abnormal bleeding , leg swelling .   12/23/2019 Patient returns for follow up , she complains only of mild fatigue . CBC last week at Dr Gray showed Hb : 11.8 , platelets : 82 .  07/12/2021 Patient returns for interval follow up , she continues on prednisone 2.5 mg every other day . She started on sublingual B12 while in [place] for mild anemia and low B12 ( serology for pernicious anemia was allegedly negative ) , last Hgb in May was back to normal . She denies anty abnormal bleeding .   10/7/2021 Patient returns for history of ITP . She reports dark colored urine for one day and thinks she has UTI . no other abnormal bleeding . she was was seen recently by Dr Gray and asked to stop losartan due to increased Cr , renal sono was negative in 7?2021 .    7/27/2023 Patient returns for follow p for ITP , she feels well except for occasional easy bruising , no mucosal bleeding . last platelets were 120 in May while in [place].  She denies any B symptoms , arthralgias.    1/4/2024 Patient returns for history of ITP , she has a small left subconjunctival hemorrhage ,no other abnormal bleeding . last platelets 120 in December .   2/5/2024 Patient returns for history of ITP , COvid 19 in October 2023 . Her platelets were 82 one month ago , 68 two  weeks ago . She denies any bleeding symptoms

## 2024-02-05 NOTE — ASSESSMENT
[FreeTextEntry1] : History of chronic ITP  platelets trending down ? asymptomatic  Jan 4 : 82 Jan 18 : 69 Feb 5 : 68 Plan : repeat in 2 weeks prior to move to [place] for rest of winter. will follow with local Hem/Onc.

## 2024-02-05 NOTE — PHYSICAL EXAM
[Normal] : normoactive bowel sounds, soft and nontender, no hepatosplenomegaly or masses appreciated [Thrush] : no thrush [de-identified] : small conjunctival hematoma [de-identified] : no edema [de-identified] : no bruising or petechiae . .

## 2024-02-06 DIAGNOSIS — D69.6 THROMBOCYTOPENIA, UNSPECIFIED: ICD-10-CM

## 2024-02-07 LAB
HBV CORE IGG+IGM SER QL: NONREACTIVE
HBV SURFACE AB SER QL: ABNORMAL
HBV SURFACE AG SER QL: NONREACTIVE
HCV RNA SERPL NAA+PROBE-LOG IU: NOT DETECTED LOGIU/ML
HEPC RNA INTERP: NOT DETECTED IU/ML

## 2024-02-20 ENCOUNTER — APPOINTMENT (OUTPATIENT)
Dept: HEMATOLOGY ONCOLOGY | Facility: CLINIC | Age: 79
End: 2024-02-20

## 2024-02-20 ENCOUNTER — OUTPATIENT (OUTPATIENT)
Dept: OUTPATIENT SERVICES | Facility: HOSPITAL | Age: 79
LOS: 1 days | End: 2024-02-20
Payer: MEDICARE

## 2024-02-20 DIAGNOSIS — D69.6 THROMBOCYTOPENIA, UNSPECIFIED: ICD-10-CM

## 2024-02-20 LAB
BASOPHILS # BLD AUTO: 0.13 K/UL
BASOPHILS NFR BLD AUTO: 2.6 %
EOSINOPHIL # BLD AUTO: 0.41 K/UL
EOSINOPHIL NFR BLD AUTO: 8.2 %
HCT VFR BLD CALC: 36.5 %
HGB BLD-MCNC: 12.7 G/DL
IMM GRANULOCYTES NFR BLD AUTO: 1 %
LYMPHOCYTES # BLD AUTO: 1.45 K/UL
LYMPHOCYTES NFR BLD AUTO: 28.9 %
MAN DIFF?: NORMAL
MCHC RBC-ENTMCNC: 30.5 PG
MCHC RBC-ENTMCNC: 34.8 G/DL
MCV RBC AUTO: 87.7 FL
MONOCYTES # BLD AUTO: 0.41 K/UL
MONOCYTES NFR BLD AUTO: 8.2 %
NEUTROPHILS # BLD AUTO: 2.57 K/UL
NEUTROPHILS NFR BLD AUTO: 51.1 %
PLATELET # BLD AUTO: 55 K/UL
PMV BLD AUTO: 0 /100 WBCS
RBC # BLD: 4.16 M/UL
RBC # FLD: 12.4 %
WBC # FLD AUTO: 5.02 K/UL

## 2024-02-20 PROCEDURE — 85027 COMPLETE CBC AUTOMATED: CPT

## 2024-02-20 PROCEDURE — 36416 COLLJ CAPILLARY BLOOD SPEC: CPT

## 2024-07-22 ENCOUNTER — APPOINTMENT (OUTPATIENT)
Age: 79
End: 2024-07-22
Payer: MEDICARE

## 2024-07-22 ENCOUNTER — LABORATORY RESULT (OUTPATIENT)
Age: 79
End: 2024-07-22

## 2024-07-22 ENCOUNTER — OUTPATIENT (OUTPATIENT)
Dept: OUTPATIENT SERVICES | Facility: HOSPITAL | Age: 79
LOS: 1 days | End: 2024-07-22
Payer: MEDICARE

## 2024-07-22 VITALS
WEIGHT: 157 LBS | RESPIRATION RATE: 17 BRPM | HEART RATE: 80 BPM | OXYGEN SATURATION: 98 % | HEIGHT: 67 IN | TEMPERATURE: 98 F | BODY MASS INDEX: 24.64 KG/M2 | DIASTOLIC BLOOD PRESSURE: 82 MMHG | SYSTOLIC BLOOD PRESSURE: 144 MMHG

## 2024-07-22 DIAGNOSIS — D69.6 THROMBOCYTOPENIA, UNSPECIFIED: ICD-10-CM

## 2024-07-22 LAB
ALBUMIN SERPL ELPH-MCNC: 4.7 G/DL
ALP BLD-CCNC: 85 U/L
ALT SERPL-CCNC: 16 U/L
ANION GAP SERPL CALC-SCNC: 10 MMOL/L
AST SERPL-CCNC: 25 U/L
BILIRUB SERPL-MCNC: 0.9 MG/DL
BUN SERPL-MCNC: 28 MG/DL
CALCIUM SERPL-MCNC: 9.9 MG/DL
CHLORIDE SERPL-SCNC: 109 MMOL/L
CO2 SERPL-SCNC: 23 MMOL/L
CREAT SERPL-MCNC: 1.1 MG/DL
EGFR: 52 ML/MIN/1.73M2
GLUCOSE SERPL-MCNC: 102 MG/DL
HCT VFR BLD CALC: 36.6 %
HGB BLD-MCNC: 12.3 G/DL
MAGNESIUM SERPL-MCNC: 2.1 MG/DL
MCHC RBC-ENTMCNC: 30.2 PG
MCHC RBC-ENTMCNC: 33.6 G/DL
MCV RBC AUTO: 89.9 FL
PLATELET # BLD AUTO: 227 K/UL
PMV BLD: 9.3 FL
POTASSIUM SERPL-SCNC: 4.4 MMOL/L
PROT SERPL-MCNC: 6.5 G/DL
RBC # BLD: 4.07 M/UL
RBC # FLD: 12.8 %
SODIUM SERPL-SCNC: 142 MMOL/L
WBC # FLD AUTO: 6.8 K/UL

## 2024-07-22 PROCEDURE — 80053 COMPREHEN METABOLIC PANEL: CPT

## 2024-07-22 PROCEDURE — 99213 OFFICE O/P EST LOW 20 MIN: CPT

## 2024-07-22 PROCEDURE — 83735 ASSAY OF MAGNESIUM: CPT

## 2024-07-22 PROCEDURE — 85027 COMPLETE CBC AUTOMATED: CPT

## 2024-07-22 NOTE — PHYSICAL EXAM
[Normal] : normoactive bowel sounds, soft and nontender, no hepatosplenomegaly or masses appreciated [Thrush] : no thrush [de-identified] : small conjunctival hematoma [de-identified] : no edema [de-identified] : no bruising or petechiae . .

## 2024-07-22 NOTE — PHYSICAL EXAM
[Normal] : normoactive bowel sounds, soft and nontender, no hepatosplenomegaly or masses appreciated [Thrush] : no thrush [de-identified] : small conjunctival hematoma [de-identified] : no edema [de-identified] : no bruising or petechiae . .

## 2024-07-22 NOTE — ASSESSMENT
[FreeTextEntry1] : History of chronic ITP    # Chronic ITP  - previously she was on steroids for 3 years, led to osteoporosis and wt gain, was stopped because of side effects - In March 2024, Plt count dropped to 27K , she received a week of prednisone with some response - Started on Promacta 100 mcg by Dr. Reyes in [place], with good response - Dose was ramped up to 200 mcg weekly because plt started to trend down  - Last CBC from 7/15/22: unremarkable with Plt count 185K - Reapt CBC today showed plt count of 227  Plan:  - Will drop the dose of Promacta to 140 mcg (2 mcg/kg) and make schedule [id number].  - She plans to go for hip surgery. If plt count remains stable, she can go for the surgery. She will need to follow up closely for CBC check.  RTC in 3 weeks

## 2024-07-22 NOTE — HISTORY OF PRESENT ILLNESS
[de-identified] : 07/12/2018 : Patient returns for follow up after 2 courses of pulse decadron 20mg X 4. She complained of insomnia on the medication , she had no response to the first cycle and required Iv IG with good response. She denies any abnormal bleeding at this time.  09/06/2018 : patient returns for follow up , she is on prednisone 10mg daily and is frustrated with her inability to lose weight with diet and exercise. She is otherwise asymptomatic.   12/06/2018 Patient returns for follow up , she is currently on prednisone 5 mg , her weight is back to baseline , she continues on dyazide every other day for edema, she denies any abnormal bleeding , .  01/03/2019 Patient returns for follow up , she is on prednisone 5mg every other day , recent blood work showed K of 3.3 . she continues on dyazide every other day for edema . SHe is back to her baseline weight . She had three nosebleeds last month .  05/30/2019 Patient returns for follow up , she continues on prednisone 10mg daily and reports weight gain of 10 lbs . she denies any abnormal bleeding . She feels swollen after she stopped the diuretic and started on losartan 50mg for hypertension . She reports occasional orthostatic dizziness.   09/10/2019 Patient returns for follow up , she is on prednisone 5 mg alternating with 2.5 mg daily . she complains of generalized bruising including large echymosis on right shoulder , noted after colonoscopy and is possibly traumatic . platelet :45 . She has been on steroids for nearly 18 months and is " fed up " from the side effects including mood and weight fluctuations , hypertension . .   11/25/2019 Patient returns for follow up , she continues on prednisone 10mg daily . She denies any abnormal bleeding , leg swelling .   12/23/2019 Patient returns for follow up , she complains only of mild fatigue . CBC last week at Dr Gray showed Hb : 11.8 , platelets : 82 .  07/12/2021 Patient returns for interval follow up , she continues on prednisone 2.5 mg every other day . She started on sublingual B12 while in [place] for mild anemia and low B12 ( serology for pernicious anemia was allegedly negative ) , last Hgb in May was back to normal . She denies anty abnormal bleeding .   10/7/2021 Patient returns for history of ITP . She reports dark colored urine for one day and thinks she has UTI . no other abnormal bleeding . she was was seen recently by Dr Gray and asked to stop losartan due to increased Cr , renal sono was negative in 7?2021 .    7/27/2023 Patient returns for follow p for ITP , she feels well except for occasional easy bruising , no mucosal bleeding . last platelets were 120 in May while in [place].  She denies any B symptoms , arthralgias.    1/4/2024 Patient returns for history of ITP , she has a small left subconjunctival hemorrhage ,no other abnormal bleeding . last platelets 120 in December .   2/5/2024 Patient returns for history of ITP , COvid 19 in October 2023 . Her platelets were 82 one month ago , 68 two  weeks ago . She denies any bleeding symptoms   7/22/24: Patient returns from [place]. She was following up with Dr. Reyes at [facility]. After she left [place], her Plt count declined to 27K. She was treated with one week of prednisone with some improvement. She was started on Promacta 100 mcg in April. Her plt improved initially but then started declining. Promacta dose was increased to 200 mcg daily on 6/1/24. Her platelet count last week 7/15/22 was 185. Her last dose of promacta was 7/15/22. She denies any symptoms of bleeding or bruising.

## 2024-07-22 NOTE — ASSESSMENT
[FreeTextEntry1] : History of chronic ITP    # Chronic ITP  - previously she was on steroids for 3 years, led to osteoporosis and wt gain, was stopped because of side effects - In March 2024, Plt count dropped to 27K , she received a week of prednisone with some response - Started on Promacta 100 mcg by Dr. Reyes in [place], with good response - Dose was ramped up to 200 mcg weekly because plt started to trend down  - Last CBC from 7/15/22: unremarkable with Plt count 185K - Reapt CBC today showed plt count of 227  Plan:  - Will drop the dose of Promacta to 140 mcg (2 mcg/kg) and make schedule [id number].  - She plans to go for hip surgery. If plt count remains stable, she can go for the surgery. She will need to follow up closely for CBC check.  RTC in 3 weeks

## 2024-07-23 DIAGNOSIS — D69.6 THROMBOCYTOPENIA, UNSPECIFIED: ICD-10-CM

## 2024-07-30 ENCOUNTER — OUTPATIENT (OUTPATIENT)
Dept: OUTPATIENT SERVICES | Facility: HOSPITAL | Age: 79
LOS: 1 days | End: 2024-07-30
Payer: MEDICARE

## 2024-07-30 ENCOUNTER — APPOINTMENT (OUTPATIENT)
Age: 79
End: 2024-07-30

## 2024-07-30 ENCOUNTER — LABORATORY RESULT (OUTPATIENT)
Age: 79
End: 2024-07-30

## 2024-07-30 VITALS
SYSTOLIC BLOOD PRESSURE: 152 MMHG | TEMPERATURE: 98 F | DIASTOLIC BLOOD PRESSURE: 76 MMHG | HEART RATE: 85 BPM | RESPIRATION RATE: 14 BRPM

## 2024-07-30 DIAGNOSIS — D69.6 THROMBOCYTOPENIA, UNSPECIFIED: ICD-10-CM

## 2024-07-30 LAB
HCT VFR BLD CALC: 34.6 %
HGB BLD-MCNC: 11.8 G/DL
MCHC RBC-ENTMCNC: 29.6 PG
MCHC RBC-ENTMCNC: 34.1 G/DL
MCV RBC AUTO: 86.9 FL
PLATELET # BLD AUTO: 84 K/UL
PMV BLD: 9.5 FL
RBC # BLD: 3.98 M/UL
RBC # FLD: 12.5 %
WBC # FLD AUTO: 4.89 K/UL

## 2024-07-30 PROCEDURE — 85027 COMPLETE CBC AUTOMATED: CPT

## 2024-07-30 PROCEDURE — 96372 THER/PROPH/DIAG INJ SC/IM: CPT

## 2024-07-30 RX ORDER — ROMIPLOSTIM 500 UG/ML
140 INJECTION, POWDER, LYOPHILIZED, FOR SOLUTION SUBCUTANEOUS ONCE
Refills: 0 | Status: COMPLETED | OUTPATIENT
Start: 2024-07-30 | End: 2024-07-30

## 2024-07-30 RX ADMIN — ROMIPLOSTIM 140 MICROGRAM(S): 500 INJECTION, POWDER, LYOPHILIZED, FOR SOLUTION SUBCUTANEOUS at 11:40

## 2024-08-10 ENCOUNTER — TRANSCRIPTION ENCOUNTER (OUTPATIENT)
Age: 79
End: 2024-08-10

## 2024-08-13 ENCOUNTER — APPOINTMENT (OUTPATIENT)
Age: 79
End: 2024-08-13

## 2024-08-13 ENCOUNTER — OUTPATIENT (OUTPATIENT)
Dept: OUTPATIENT SERVICES | Facility: HOSPITAL | Age: 79
LOS: 1 days | End: 2024-08-13
Payer: MEDICARE

## 2024-08-13 ENCOUNTER — LABORATORY RESULT (OUTPATIENT)
Age: 79
End: 2024-08-13

## 2024-08-13 VITALS
OXYGEN SATURATION: 99 % | BODY MASS INDEX: 26.52 KG/M2 | SYSTOLIC BLOOD PRESSURE: 155 MMHG | WEIGHT: 155.31 LBS | HEART RATE: 72 BPM | HEIGHT: 64 IN | DIASTOLIC BLOOD PRESSURE: 81 MMHG | TEMPERATURE: 97.2 F

## 2024-08-13 DIAGNOSIS — D64.9 ANEMIA, UNSPECIFIED: ICD-10-CM

## 2024-08-13 LAB
HCT VFR BLD CALC: 36.8 %
HGB BLD-MCNC: 12.5 G/DL
MCHC RBC-ENTMCNC: 29.9 PG
MCHC RBC-ENTMCNC: 34 G/DL
MCV RBC AUTO: 88 FL
PLATELET # BLD AUTO: 71 K/UL
PMV BLD: 10.5 FL
RBC # BLD: 4.18 M/UL
RBC # FLD: 12.8 %
WBC # FLD AUTO: 5.91 K/UL

## 2024-08-13 PROCEDURE — 99213 OFFICE O/P EST LOW 20 MIN: CPT

## 2024-08-13 PROCEDURE — 96372 THER/PROPH/DIAG INJ SC/IM: CPT

## 2024-08-13 PROCEDURE — 85027 COMPLETE CBC AUTOMATED: CPT

## 2024-08-13 PROCEDURE — 99213 OFFICE O/P EST LOW 20 MIN: CPT | Mod: 25

## 2024-08-13 RX ORDER — ROMIPLOSTIM 125 UG/.25ML
140 INJECTION, POWDER, LYOPHILIZED, FOR SOLUTION SUBCUTANEOUS ONCE
Refills: 0 | Status: COMPLETED | OUTPATIENT
Start: 2024-08-13 | End: 2024-08-13

## 2024-08-13 RX ADMIN — ROMIPLOSTIM 140 MICROGRAM(S): 125 INJECTION, POWDER, LYOPHILIZED, FOR SOLUTION SUBCUTANEOUS at 13:45

## 2024-08-14 DIAGNOSIS — D64.9 ANEMIA, UNSPECIFIED: ICD-10-CM

## 2024-08-17 NOTE — HISTORY OF PRESENT ILLNESS
[de-identified] : 07/12/2018 : Patient returns for follow up after 2 courses of pulse decadron 20mg X 4. She complained of insomnia on the medication , she had no response to the first cycle and required Iv IG with good response. She denies any abnormal bleeding at this time.  09/06/2018 : patient returns for follow up , she is on prednisone 10mg daily and is frustrated with her inability to lose weight with diet and exercise. She is otherwise asymptomatic.   12/06/2018 Patient returns for follow up , she is currently on prednisone 5 mg , her weight is back to baseline , she continues on dyazide every other day for edema, she denies any abnormal bleeding , .  01/03/2019 Patient returns for follow up , she is on prednisone 5mg every other day , recent blood work showed K of 3.3 . she continues on dyazide every other day for edema . SHe is back to her baseline weight . She had three nosebleeds last month .  05/30/2019 Patient returns for follow up , she continues on prednisone 10mg daily and reports weight gain of 10 lbs . she denies any abnormal bleeding . She feels swollen after she stopped the diuretic and started on losartan 50mg for hypertension . She reports occasional orthostatic dizziness.   09/10/2019 Patient returns for follow up , she is on prednisone 5 mg alternating with 2.5 mg daily . she complains of generalized bruising including large echymosis on right shoulder , noted after colonoscopy and is possibly traumatic . platelet :45 . She has been on steroids for nearly 18 months and is " fed up " from the side effects including mood and weight fluctuations , hypertension . .   11/25/2019 Patient returns for follow up , she continues on prednisone 10mg daily . She denies any abnormal bleeding , leg swelling .   12/23/2019 Patient returns for follow up , she complains only of mild fatigue . CBC last week at Dr Gray showed Hb : 11.8 , platelets : 82 .  07/12/2021 Patient returns for interval follow up , she continues on prednisone 2.5 mg every other day . She started on sublingual B12 while in [place] for mild anemia and low B12 ( serology for pernicious anemia was allegedly negative ) , last Hgb in May was back to normal . She denies anty abnormal bleeding .   10/7/2021 Patient returns for history of ITP . She reports dark colored urine for one day and thinks she has UTI . no other abnormal bleeding . she was was seen recently by Dr Gray and asked to stop losartan due to increased Cr , renal sono was negative in 7?2021 .    7/27/2023 Patient returns for follow p for ITP , she feels well except for occasional easy bruising , no mucosal bleeding . last platelets were 120 in May while in [place].  She denies any B symptoms , arthralgias.    1/4/2024 Patient returns for history of ITP , she has a small left subconjunctival hemorrhage ,no other abnormal bleeding . last platelets 120 in December .   2/5/2024 Patient returns for history of ITP , COvid 19 in October 2023 . Her platelets were 82 one month ago , 68 two  weeks ago . She denies any bleeding symptoms   7/22/24: Patient returns from [place]. She was following up with Dr. Reyes at [facility]. After she left [place], her Plt count declined to 27K. She was treated with one week of prednisone with some improvement. She was started on Promacta 100 mcg in April. Her plt improved initially but then started declining. Promacta dose was increased to 200 mcg daily on 6/1/24. Her platelet count last week 7/15/22 was 185. Her last dose of promacta was 7/15/22. She denies any symptoms of bleeding or bruising.   8/13/24: Patient presents for follow up of her chronic ITP. She is on Nplate 2 ug/kg Q 2weeks. Her plt count today is 71K. She tends to get bruised easily. Denies any gum or nose bleed. She is going for colonoscopy and EGD next month.

## 2024-08-17 NOTE — ASSESSMENT
[FreeTextEntry1] : History of chronic ITP    # Chronic ITP  - previously she was on steroids for 3 years, led to osteoporosis and wt gain, was stopped because of side effects - In March 2024, Plt count dropped to 27K , she received a week of prednisone with some response - Started on Nplate 100 mcg by Dr. Reyes in [place], with good response - Dose was ramped up to 200 mcg weekly because plt started to trend down  - Repeat CBC today showed plt count of 71K from 84 k (2 weeks ago)  Plan:  - c/w Nplate 140 mg Q2 weeks - Okay to proceed with colonoscopy if Plt > 50K. will recommend to schedule procedure within first week after Nplate.  - She plans to go for Knee surgery. If plt count remains stable, she can go for the surgery. She will need to follow up closely for CBC check.  RTC in 2 months

## 2024-08-17 NOTE — HISTORY OF PRESENT ILLNESS
[de-identified] : 07/12/2018 : Patient returns for follow up after 2 courses of pulse decadron 20mg X 4. She complained of insomnia on the medication , she had no response to the first cycle and required Iv IG with good response. She denies any abnormal bleeding at this time.  09/06/2018 : patient returns for follow up , she is on prednisone 10mg daily and is frustrated with her inability to lose weight with diet and exercise. She is otherwise asymptomatic.   12/06/2018 Patient returns for follow up , she is currently on prednisone 5 mg , her weight is back to baseline , she continues on dyazide every other day for edema, she denies any abnormal bleeding , .  01/03/2019 Patient returns for follow up , she is on prednisone 5mg every other day , recent blood work showed K of 3.3 . she continues on dyazide every other day for edema . SHe is back to her baseline weight . She had three nosebleeds last month .  05/30/2019 Patient returns for follow up , she continues on prednisone 10mg daily and reports weight gain of 10 lbs . she denies any abnormal bleeding . She feels swollen after she stopped the diuretic and started on losartan 50mg for hypertension . She reports occasional orthostatic dizziness.   09/10/2019 Patient returns for follow up , she is on prednisone 5 mg alternating with 2.5 mg daily . she complains of generalized bruising including large echymosis on right shoulder , noted after colonoscopy and is possibly traumatic . platelet :45 . She has been on steroids for nearly 18 months and is " fed up " from the side effects including mood and weight fluctuations , hypertension . .   11/25/2019 Patient returns for follow up , she continues on prednisone 10mg daily . She denies any abnormal bleeding , leg swelling .   12/23/2019 Patient returns for follow up , she complains only of mild fatigue . CBC last week at Dr Gray showed Hb : 11.8 , platelets : 82 .  07/12/2021 Patient returns for interval follow up , she continues on prednisone 2.5 mg every other day . She started on sublingual B12 while in [place] for mild anemia and low B12 ( serology for pernicious anemia was allegedly negative ) , last Hgb in May was back to normal . She denies anty abnormal bleeding .   10/7/2021 Patient returns for history of ITP . She reports dark colored urine for one day and thinks she has UTI . no other abnormal bleeding . she was was seen recently by Dr Gray and asked to stop losartan due to increased Cr , renal sono was negative in 7?2021 .    7/27/2023 Patient returns for follow p for ITP , she feels well except for occasional easy bruising , no mucosal bleeding . last platelets were 120 in May while in [place].  She denies any B symptoms , arthralgias.    1/4/2024 Patient returns for history of ITP , she has a small left subconjunctival hemorrhage ,no other abnormal bleeding . last platelets 120 in December .   2/5/2024 Patient returns for history of ITP , COvid 19 in October 2023 . Her platelets were 82 one month ago , 68 two  weeks ago . She denies any bleeding symptoms   7/22/24: Patient returns from [place]. She was following up with Dr. Reyes at [facility] [place]. After she left [place], her Plt count declined to 27K. She was treated with one week of prednisone with some improvement. She was started on Promacta 100 mcg in April. Her plt improved initially but then started declining. Promacta dose was increased to 200 mcg daily on 6/1/24. Her platelet count last week 7/15/22 was 185. Her last dose of promacta was 7/15/22. She denies any symptoms of bleeding or bruising.   8/13/24: Patient presents for follow up of her chronic ITP. She is on Nplate 2 ug/kg Q 2weeks. Her plt count today is 71K. She tends to get bruised easily. Denies any gum or nose bleed. She is going for colonoscopy and EGD next month.

## 2024-08-17 NOTE — PHYSICAL EXAM
[Normal] : normoactive bowel sounds, soft and nontender, no hepatosplenomegaly or masses appreciated [Thrush] : no thrush [de-identified] : small conjunctival hematoma [de-identified] : no edema [de-identified] : no bruising or petechiae . .

## 2024-08-17 NOTE — PHYSICAL EXAM
[Normal] : normoactive bowel sounds, soft and nontender, no hepatosplenomegaly or masses appreciated [Thrush] : no thrush [de-identified] : small conjunctival hematoma [de-identified] : no edema [de-identified] : no bruising or petechiae . .

## 2024-08-27 ENCOUNTER — OUTPATIENT (OUTPATIENT)
Dept: OUTPATIENT SERVICES | Facility: HOSPITAL | Age: 79
LOS: 1 days | End: 2024-08-27
Payer: MEDICARE

## 2024-08-27 ENCOUNTER — LABORATORY RESULT (OUTPATIENT)
Age: 79
End: 2024-08-27

## 2024-08-27 ENCOUNTER — APPOINTMENT (OUTPATIENT)
Age: 79
End: 2024-08-27

## 2024-08-27 VITALS — HEART RATE: 64 BPM | SYSTOLIC BLOOD PRESSURE: 151 MMHG | TEMPERATURE: 97 F | DIASTOLIC BLOOD PRESSURE: 75 MMHG

## 2024-08-27 DIAGNOSIS — D64.9 ANEMIA, UNSPECIFIED: ICD-10-CM

## 2024-08-27 LAB
HCT VFR BLD CALC: 34.1 %
HGB BLD-MCNC: 11.9 G/DL
MCHC RBC-ENTMCNC: 30.2 PG
MCHC RBC-ENTMCNC: 34.9 G/DL
MCV RBC AUTO: 86.5 FL
PLATELET # BLD AUTO: 56 K/UL
PMV BLD: 11 FL
RBC # BLD: 3.94 M/UL
RBC # FLD: 12.5 %
WBC # FLD AUTO: 5.17 K/UL

## 2024-08-27 PROCEDURE — 85027 COMPLETE CBC AUTOMATED: CPT

## 2024-08-27 PROCEDURE — 96372 THER/PROPH/DIAG INJ SC/IM: CPT

## 2024-08-27 RX ORDER — ROMIPLOSTIM 125 UG/.25ML
140 INJECTION, POWDER, LYOPHILIZED, FOR SOLUTION SUBCUTANEOUS ONCE
Refills: 0 | Status: COMPLETED | OUTPATIENT
Start: 2024-08-27 | End: 2024-08-27

## 2024-08-27 RX ADMIN — ROMIPLOSTIM 140 MICROGRAM(S): 125 INJECTION, POWDER, LYOPHILIZED, FOR SOLUTION SUBCUTANEOUS at 12:20

## 2024-08-27 NOTE — HISTORY OF PRESENT ILLNESS
Darien Lin was seen and treated in our emergency department on 8/27/2024.  He may return to work on 08/30/2024.       If you have any questions or concerns, please don't hesitate to call.      Mariam Rao, APRN-CNP [de-identified] : 07/12/2018 : Patient returns for follow up after 2 courses of pulse decadron 20mg X 4. She complained of insomnia on the medication , she had no response to the first cycle and required Iv IG with good response. She denies any abnormal bleeding at this time.  09/06/2018 : patient returns for follow up , she is on prednisone 10mg daily and is frustrated with her inability to lose weight with diet and exercise. She is otherwise asymptomatic.   12/06/2018 Patient returns for follow up , she is currently on prednisone 5 mg , her weight is back to baseline , she continues on dyazide every other day for edema, she denies any abnormal bleeding , .  01/03/2019 Patient returns for follow up , she is on prednisone 5mg every other day , recent blood work showed K of 3.3 . she continues on dyazide every other day for edema . SHe is back to her baseline weight . She had three nosebleeds last month .  05/30/2019 Patient returns for follow up , she continues on prednisone 10mg daily and reports weight gain of 10 lbs . she denies any abnormal bleeding . She feels swollen after she stopped the diuretic and started on losartan 50mg for hypertension . She reports occasional orthostatic dizziness.   09/10/2019 Patient returns for follow up , she is on prednisone 5 mg alternating with 2.5 mg daily . she complains of generalized bruising including large echymosis on right shoulder , noted after colonoscopy and is possibly traumatic . platelet :45 . She has been on steroids for nearly 18 months and is " fed up " from the side effects including mood and weight fluctuations , hypertension . .   11/25/2019 Patient returns for follow up , she continues on prednisone 10mg daily . She denies any abnormal bleeding , leg swelling .   12/23/2019 Patient returns for follow up , she complains only of mild fatigue . CBC last week at Dr Gray showed Hb : 11.8 , platelets : 82 .  07/12/2021 Patient returns for interval follow up , she continues on prednisone 2.5 mg every other day . She started on sublingual B12 while in [place] for mild anemia and low B12 ( serology for pernicious anemia was allegedly negative ) , last Hgb in May was back to normal . She denies anty abnormal bleeding .   10/7/2021 Patient returns for history of ITP . She reports dark colored urine for one day and thinks she has UTI . no other abnormal bleeding . she was was seen recently by Dr Gray and asked to stop losartan due to increased Cr , renal sono was negative in 7?2021 .    7/27/2023 Patient returns for follow p for ITP , she feels well except for occasional easy bruising , no mucosal bleeding . last platelets were 120 in May while in [place].  She denies any B symptoms , arthralgias.    1/4/2024 Patient returns for history of ITP , she has a small left subconjunctival hemorrhage ,no other abnormal bleeding . last platelets 120 in December .   2/5/2024 Patient returns for history of ITP , COvid 19 in October 2023 . Her platelets were 82 one month ago , 68 two  weeks ago . She denies any bleeding symptoms   7/22/24: Patient returns from [place]. She was following up with Dr. Reyes at [place] [facility] [place]. After she left [place], her Plt count declined to 27K. She was treated with one week of prednisone with some improvement. She was started on Promacta 100 mcg in April. Her plt improved initially but then started declining. Promacta dose was increased to 200 mcg daily on 6/1/24. Her platelet count last week 7/15/22 was 185. Her last dose of promacta was 7/15/22. She denies any symptoms of bleeding or bruising.

## 2024-09-03 ENCOUNTER — APPOINTMENT (OUTPATIENT)
Dept: ORTHOPEDIC SURGERY | Facility: CLINIC | Age: 79
End: 2024-09-03

## 2024-09-03 DIAGNOSIS — M17.0 BILATERAL PRIMARY OSTEOARTHRITIS OF KNEE: ICD-10-CM

## 2024-09-03 PROCEDURE — 73560 X-RAY EXAM OF KNEE 1 OR 2: CPT | Mod: 50

## 2024-09-03 PROCEDURE — 20610 DRAIN/INJ JOINT/BURSA W/O US: CPT | Mod: RT

## 2024-09-03 PROCEDURE — 99203 OFFICE O/P NEW LOW 30 MIN: CPT | Mod: 25

## 2024-09-03 NOTE — HISTORY OF PRESENT ILLNESS
[de-identified] : Left knee pain also to a lesser degree right knee pain both knees with oa prepared to consider a left tka, would like an injectin right knee. Mechanical pain made worse with activity, not completely improved with rest, interfering with ADLs. At this point nonoperative management of the symptoms is ineffective and the patient has interest in moving forward with a joint replacement.   Past medical history is on the chart for review and as mentioned above.   ROS is negative for fever, chills, CP, SOB, unexplained weight loss or gain.       LEFT KNEE EXAM Knee is painful with PROM and limited in flexion and extension by guarding. Crepitation with ROM +JLT, TTP over the femoral condyles There is significant guarding throughout the exam limiting ROM testing. AROM demonstrated is 2-100 NVID No gross instability noted but exam limited by pain/guarding.     Imaging: X-rays AP and Lateral weight bearing views of the knees shows advanced degenerative changes including loss of the joint space with bone to bone apposition, osteophytes, subchondral sclerosis and cystic changes of the left knee.     Assessment: Severe left knee arthritis.     Plan is for a left  total knee replacement. The surgery, preoperative workup, surgical procedure, hospital course, post operative course, rehab, and expected outcomes were discussed. A description of the surgery in layman's terms, using models equivalent to the implants used during surgery, was a part of this conversation. The tibia and femur are resurfaced, ligament balance is restored, and the patella is addressed on a case by case basis, either resurfaced or preserved.   The patient will require a rolling walker for 2-4 weeks postoperativley due to gait instability to help with mobility related ADL's and a commode as they confined to a one level without access to a bathroom.  Patient is able to safely use the walker and functional mobility deficit can be sufficiently resolved with use of a walker.   The risks and benefits of the surgery were discussed.  Benefits  were described as improvement in pain and function.  Risks including bleeding, infection, blood clots, DVT, PE, stroke, heart attack, fracture, loss of motion, arthrofibrosis, implant loosening, instability, nerve palsy, neurovascular injury, persistent pain, RSD, and in rare cases death. Should the implant not function as expected or wear out then revision surgery may be required in the future.   right knee injected with cortisone  Prior to injection, risks and benefits of the procedure were discussed, including but not limited to pain, swelling, failure to improve symptoms and in rare cases infection or allergic reaction.  The knee was prepped and draped with betadine and alcohol.  Using sterile technique 1cc of 40mg/cc kenalog solution mixed with 4cc of 1% lidocaine was injected thru an anterolateral portal using a 22guage needle.  Upon withdrawing the needle pressure was applied with to the injection site for approximately 1 minute.  Once hemostasis was achieved a band-aid dressing was applied.  The patient tolerated the procedure well.

## 2024-09-10 ENCOUNTER — APPOINTMENT (OUTPATIENT)
Age: 79
End: 2024-09-10

## 2024-09-10 ENCOUNTER — LABORATORY RESULT (OUTPATIENT)
Age: 79
End: 2024-09-10

## 2024-09-10 ENCOUNTER — OUTPATIENT (OUTPATIENT)
Dept: OUTPATIENT SERVICES | Facility: HOSPITAL | Age: 79
LOS: 1 days | End: 2024-09-10
Payer: MEDICARE

## 2024-09-10 DIAGNOSIS — D64.9 ANEMIA, UNSPECIFIED: ICD-10-CM

## 2024-09-10 LAB
HCT VFR BLD CALC: 36 %
HGB BLD-MCNC: 12.7 G/DL
MCHC RBC-ENTMCNC: 31.1 PG
MCHC RBC-ENTMCNC: 35.3 G/DL
MCV RBC AUTO: 88.2 FL
PLATELET # BLD AUTO: 57 K/UL
PMV BLD: 10.1 FL
RBC # BLD: 4.08 M/UL
RBC # FLD: 12.6 %
WBC # FLD AUTO: 6.56 K/UL

## 2024-09-10 PROCEDURE — 96372 THER/PROPH/DIAG INJ SC/IM: CPT

## 2024-09-10 PROCEDURE — 85027 COMPLETE CBC AUTOMATED: CPT

## 2024-09-10 RX ORDER — ROMIPLOSTIM 250 UG/.5ML
140 INJECTION, POWDER, LYOPHILIZED, FOR SOLUTION SUBCUTANEOUS ONCE
Refills: 0 | Status: COMPLETED | OUTPATIENT
Start: 2024-09-10 | End: 2024-09-10

## 2024-09-10 RX ADMIN — ROMIPLOSTIM 140 MICROGRAM(S): 250 INJECTION, POWDER, LYOPHILIZED, FOR SOLUTION SUBCUTANEOUS at 11:09

## 2024-09-11 DIAGNOSIS — D64.9 ANEMIA, UNSPECIFIED: ICD-10-CM

## 2024-09-24 ENCOUNTER — APPOINTMENT (OUTPATIENT)
Age: 79
End: 2024-09-24

## 2024-09-24 ENCOUNTER — OUTPATIENT (OUTPATIENT)
Dept: OUTPATIENT SERVICES | Facility: HOSPITAL | Age: 79
LOS: 1 days | End: 2024-09-24
Payer: MEDICARE

## 2024-09-24 ENCOUNTER — LABORATORY RESULT (OUTPATIENT)
Age: 79
End: 2024-09-24

## 2024-09-24 VITALS — TEMPERATURE: 98 F | DIASTOLIC BLOOD PRESSURE: 74 MMHG | SYSTOLIC BLOOD PRESSURE: 132 MMHG | HEART RATE: 74 BPM

## 2024-09-24 DIAGNOSIS — D69.6 THROMBOCYTOPENIA, UNSPECIFIED: ICD-10-CM

## 2024-09-24 DIAGNOSIS — D64.9 ANEMIA, UNSPECIFIED: ICD-10-CM

## 2024-09-24 PROCEDURE — 96372 THER/PROPH/DIAG INJ SC/IM: CPT

## 2024-09-24 PROCEDURE — 85027 COMPLETE CBC AUTOMATED: CPT

## 2024-09-24 RX ORDER — ROMIPLOSTIM 250 UG/.5ML
140 INJECTION, POWDER, LYOPHILIZED, FOR SOLUTION SUBCUTANEOUS ONCE
Refills: 0 | Status: COMPLETED | OUTPATIENT
Start: 2024-09-24 | End: 2024-09-24

## 2024-09-24 RX ADMIN — ROMIPLOSTIM 140 MICROGRAM(S): 250 INJECTION, POWDER, LYOPHILIZED, FOR SOLUTION SUBCUTANEOUS at 11:29

## 2024-09-25 LAB
HCT VFR BLD CALC: 38.2 %
HGB BLD-MCNC: 13.2 G/DL
MCHC RBC-ENTMCNC: 30.4 PG
MCHC RBC-ENTMCNC: 34.6 G/DL
MCV RBC AUTO: 88 FL
PLATELET # BLD AUTO: 69 K/UL
PMV BLD: 10.7 FL
RBC # BLD: 4.34 M/UL
RBC # FLD: 12.8 %
WBC # FLD AUTO: 6.58 K/UL

## 2024-10-10 ENCOUNTER — APPOINTMENT (OUTPATIENT)
Age: 79
End: 2024-10-10

## 2024-10-10 ENCOUNTER — LABORATORY RESULT (OUTPATIENT)
Age: 79
End: 2024-10-10

## 2024-10-10 ENCOUNTER — OUTPATIENT (OUTPATIENT)
Dept: OUTPATIENT SERVICES | Facility: HOSPITAL | Age: 79
LOS: 1 days | Discharge: HOME | End: 2024-10-10
Payer: MEDICARE

## 2024-10-10 VITALS
BODY MASS INDEX: 26.46 KG/M2 | TEMPERATURE: 97.4 F | HEIGHT: 64 IN | RESPIRATION RATE: 16 BRPM | WEIGHT: 155 LBS | DIASTOLIC BLOOD PRESSURE: 77 MMHG | SYSTOLIC BLOOD PRESSURE: 148 MMHG | HEART RATE: 93 BPM

## 2024-10-10 VITALS — TEMPERATURE: 97 F | SYSTOLIC BLOOD PRESSURE: 148 MMHG | HEART RATE: 93 BPM | DIASTOLIC BLOOD PRESSURE: 77 MMHG

## 2024-10-10 DIAGNOSIS — D69.6 THROMBOCYTOPENIA, UNSPECIFIED: ICD-10-CM

## 2024-10-10 PROCEDURE — 96372 THER/PROPH/DIAG INJ SC/IM: CPT

## 2024-10-10 PROCEDURE — 99213 OFFICE O/P EST LOW 20 MIN: CPT

## 2024-10-10 PROCEDURE — 99213 OFFICE O/P EST LOW 20 MIN: CPT | Mod: 25

## 2024-10-10 PROCEDURE — 85027 COMPLETE CBC AUTOMATED: CPT

## 2024-10-10 RX ORDER — ROMIPLOSTIM 500 UG/ML
140 INJECTION, POWDER, LYOPHILIZED, FOR SOLUTION SUBCUTANEOUS ONCE
Refills: 0 | Status: COMPLETED | OUTPATIENT
Start: 2024-10-10 | End: 2024-10-10

## 2024-10-10 RX ADMIN — ROMIPLOSTIM 140 MICROGRAM(S): 500 INJECTION, POWDER, LYOPHILIZED, FOR SOLUTION SUBCUTANEOUS at 11:43

## 2024-10-11 ENCOUNTER — NON-APPOINTMENT (OUTPATIENT)
Age: 79
End: 2024-10-11

## 2024-10-11 ENCOUNTER — APPOINTMENT (OUTPATIENT)
Facility: CLINIC | Age: 79
End: 2024-10-11

## 2024-10-11 DIAGNOSIS — D69.6 THROMBOCYTOPENIA, UNSPECIFIED: ICD-10-CM

## 2024-10-11 LAB
HCT VFR BLD CALC: 36.7 %
HGB BLD-MCNC: 12.7 G/DL
MCHC RBC-ENTMCNC: 30.6 PG
MCHC RBC-ENTMCNC: 34.6 G/DL
MCV RBC AUTO: 88.4 FL
PLATELET # BLD AUTO: 69 K/UL
PMV BLD: 10.8 FL
RBC # BLD: 4.15 M/UL
RBC # FLD: 12.9 %
WBC # FLD AUTO: 6.26 K/UL

## 2024-10-11 PROCEDURE — 99214 OFFICE O/P EST MOD 30 MIN: CPT | Mod: 25

## 2024-10-11 PROCEDURE — 92134 CPTRZ OPH DX IMG PST SGM RTA: CPT

## 2024-10-11 PROCEDURE — 99204 OFFICE O/P NEW MOD 45 MIN: CPT | Mod: 25

## 2024-10-11 PROCEDURE — 67028 INJECTION EYE DRUG: CPT | Mod: LT

## 2024-10-16 ENCOUNTER — NON-APPOINTMENT (OUTPATIENT)
Age: 79
End: 2024-10-16

## 2024-10-22 ENCOUNTER — RESULT REVIEW (OUTPATIENT)
Age: 79
End: 2024-10-22

## 2024-10-22 ENCOUNTER — OUTPATIENT (OUTPATIENT)
Dept: OUTPATIENT SERVICES | Facility: HOSPITAL | Age: 79
LOS: 1 days | End: 2024-10-22
Payer: MEDICARE

## 2024-10-22 VITALS
WEIGHT: 154.1 LBS | RESPIRATION RATE: 17 BRPM | HEART RATE: 63 BPM | OXYGEN SATURATION: 99 % | DIASTOLIC BLOOD PRESSURE: 82 MMHG | TEMPERATURE: 99 F | SYSTOLIC BLOOD PRESSURE: 152 MMHG | HEIGHT: 64 IN

## 2024-10-22 DIAGNOSIS — Z98.890 OTHER SPECIFIED POSTPROCEDURAL STATES: Chronic | ICD-10-CM

## 2024-10-22 DIAGNOSIS — Z01.818 ENCOUNTER FOR OTHER PREPROCEDURAL EXAMINATION: ICD-10-CM

## 2024-10-22 DIAGNOSIS — Z98.41 CATARACT EXTRACTION STATUS, RIGHT EYE: Chronic | ICD-10-CM

## 2024-10-22 DIAGNOSIS — M17.12 UNILATERAL PRIMARY OSTEOARTHRITIS, LEFT KNEE: ICD-10-CM

## 2024-10-22 DIAGNOSIS — Z98.891 HISTORY OF UTERINE SCAR FROM PREVIOUS SURGERY: Chronic | ICD-10-CM

## 2024-10-22 DIAGNOSIS — Z98.42 CATARACT EXTRACTION STATUS, LEFT EYE: Chronic | ICD-10-CM

## 2024-10-22 LAB
A1C WITH ESTIMATED AVERAGE GLUCOSE RESULT: 5.4 % — SIGNIFICANT CHANGE UP (ref 4–5.6)
ALBUMIN SERPL ELPH-MCNC: 4.4 G/DL — SIGNIFICANT CHANGE UP (ref 3.5–5.2)
ALP SERPL-CCNC: 100 U/L — SIGNIFICANT CHANGE UP (ref 30–115)
ALT FLD-CCNC: 16 U/L — SIGNIFICANT CHANGE UP (ref 0–41)
ANION GAP SERPL CALC-SCNC: 10 MMOL/L — SIGNIFICANT CHANGE UP (ref 7–14)
APTT BLD: 32.5 SEC — SIGNIFICANT CHANGE UP (ref 27–39.2)
AST SERPL-CCNC: 22 U/L — SIGNIFICANT CHANGE UP (ref 0–41)
BASOPHILS # BLD AUTO: 0.09 K/UL — SIGNIFICANT CHANGE UP (ref 0–0.2)
BASOPHILS NFR BLD AUTO: 1.8 % — HIGH (ref 0–1)
BILIRUB SERPL-MCNC: 0.8 MG/DL — SIGNIFICANT CHANGE UP (ref 0.2–1.2)
BLD GP AB SCN SERPL QL: SIGNIFICANT CHANGE UP
BUN SERPL-MCNC: 27 MG/DL — HIGH (ref 10–20)
CALCIUM SERPL-MCNC: 9.5 MG/DL — SIGNIFICANT CHANGE UP (ref 8.4–10.5)
CHLORIDE SERPL-SCNC: 108 MMOL/L — SIGNIFICANT CHANGE UP (ref 98–110)
CO2 SERPL-SCNC: 24 MMOL/L — SIGNIFICANT CHANGE UP (ref 17–32)
CREAT SERPL-MCNC: 1 MG/DL — SIGNIFICANT CHANGE UP (ref 0.7–1.5)
EGFR: 58 ML/MIN/1.73M2 — LOW
EOSINOPHIL # BLD AUTO: 0.21 K/UL — SIGNIFICANT CHANGE UP (ref 0–0.7)
EOSINOPHIL NFR BLD AUTO: 4.3 % — SIGNIFICANT CHANGE UP (ref 0–8)
ESTIMATED AVERAGE GLUCOSE: 108 MG/DL — SIGNIFICANT CHANGE UP (ref 68–114)
GLUCOSE SERPL-MCNC: 93 MG/DL — SIGNIFICANT CHANGE UP (ref 70–99)
HCT VFR BLD CALC: 36.1 % — LOW (ref 37–47)
HGB BLD-MCNC: 12.2 G/DL — SIGNIFICANT CHANGE UP (ref 12–16)
IMM GRANULOCYTES NFR BLD AUTO: 0.4 % — HIGH (ref 0.1–0.3)
INR BLD: 1.03 RATIO — SIGNIFICANT CHANGE UP (ref 0.65–1.3)
LYMPHOCYTES # BLD AUTO: 1.39 K/UL — SIGNIFICANT CHANGE UP (ref 1.2–3.4)
LYMPHOCYTES # BLD AUTO: 28.2 % — SIGNIFICANT CHANGE UP (ref 20.5–51.1)
MCHC RBC-ENTMCNC: 30.1 PG — SIGNIFICANT CHANGE UP (ref 27–31)
MCHC RBC-ENTMCNC: 33.8 G/DL — SIGNIFICANT CHANGE UP (ref 32–37)
MCV RBC AUTO: 89.1 FL — SIGNIFICANT CHANGE UP (ref 81–99)
MONOCYTES # BLD AUTO: 0.37 K/UL — SIGNIFICANT CHANGE UP (ref 0.1–0.6)
MONOCYTES NFR BLD AUTO: 7.5 % — SIGNIFICANT CHANGE UP (ref 1.7–9.3)
MRSA PCR RESULT.: NEGATIVE — SIGNIFICANT CHANGE UP
NEUTROPHILS # BLD AUTO: 2.85 K/UL — SIGNIFICANT CHANGE UP (ref 1.4–6.5)
NEUTROPHILS NFR BLD AUTO: 57.8 % — SIGNIFICANT CHANGE UP (ref 42.2–75.2)
NRBC # BLD: 0 /100 WBCS — SIGNIFICANT CHANGE UP (ref 0–0)
PLATELET # BLD AUTO: 66 K/UL — LOW (ref 130–400)
PMV BLD: 10.4 FL — SIGNIFICANT CHANGE UP (ref 7.4–10.4)
POTASSIUM SERPL-MCNC: 4.1 MMOL/L — SIGNIFICANT CHANGE UP (ref 3.5–5)
POTASSIUM SERPL-SCNC: 4.1 MMOL/L — SIGNIFICANT CHANGE UP (ref 3.5–5)
PROT SERPL-MCNC: 6.4 G/DL — SIGNIFICANT CHANGE UP (ref 6–8)
PROTHROM AB SERPL-ACNC: 11.8 SEC — SIGNIFICANT CHANGE UP (ref 9.95–12.87)
RBC # BLD: 4.05 M/UL — LOW (ref 4.2–5.4)
RBC # FLD: 12.9 % — SIGNIFICANT CHANGE UP (ref 11.5–14.5)
SODIUM SERPL-SCNC: 142 MMOL/L — SIGNIFICANT CHANGE UP (ref 135–146)
WBC # BLD: 4.93 K/UL — SIGNIFICANT CHANGE UP (ref 4.8–10.8)
WBC # FLD AUTO: 4.93 K/UL — SIGNIFICANT CHANGE UP (ref 4.8–10.8)

## 2024-10-22 PROCEDURE — 86900 BLOOD TYPING SEROLOGIC ABO: CPT

## 2024-10-22 PROCEDURE — 80053 COMPREHEN METABOLIC PANEL: CPT

## 2024-10-22 PROCEDURE — 73562 X-RAY EXAM OF KNEE 3: CPT | Mod: 26,LT

## 2024-10-22 PROCEDURE — 85610 PROTHROMBIN TIME: CPT

## 2024-10-22 PROCEDURE — 86850 RBC ANTIBODY SCREEN: CPT

## 2024-10-22 PROCEDURE — 86901 BLOOD TYPING SEROLOGIC RH(D): CPT

## 2024-10-22 PROCEDURE — 85025 COMPLETE CBC W/AUTO DIFF WBC: CPT

## 2024-10-22 PROCEDURE — 73562 X-RAY EXAM OF KNEE 3: CPT | Mod: LT

## 2024-10-22 PROCEDURE — 99214 OFFICE O/P EST MOD 30 MIN: CPT | Mod: 25

## 2024-10-22 PROCEDURE — 93005 ELECTROCARDIOGRAM TRACING: CPT

## 2024-10-22 PROCEDURE — 36415 COLL VENOUS BLD VENIPUNCTURE: CPT

## 2024-10-22 PROCEDURE — 72170 X-RAY EXAM OF PELVIS: CPT | Mod: 26

## 2024-10-22 PROCEDURE — 72170 X-RAY EXAM OF PELVIS: CPT

## 2024-10-22 PROCEDURE — 85730 THROMBOPLASTIN TIME PARTIAL: CPT

## 2024-10-22 PROCEDURE — 93010 ELECTROCARDIOGRAM REPORT: CPT

## 2024-10-22 PROCEDURE — 87640 STAPH A DNA AMP PROBE: CPT

## 2024-10-22 PROCEDURE — 83036 HEMOGLOBIN GLYCOSYLATED A1C: CPT

## 2024-10-22 PROCEDURE — 87641 MR-STAPH DNA AMP PROBE: CPT

## 2024-10-22 RX ORDER — LEVOTHYROXINE SODIUM 88 MCG
1 TABLET ORAL
Refills: 0 | DISCHARGE

## 2024-10-22 NOTE — H&P PST ADULT - HISTORY OF PRESENT ILLNESS
PATIENT DENIES CHEST PAIN, SHORTNESS OF BREATH, PALPITATIONS, COUGHING, FEVER, DYSURIA.    NO COUGH, FEVER, SORE THROAT, HEADACHE, LOSS OF TASTE OR SMELL. NO KNOWN EXPOSURE TO ANYONE WITH COVID. PATIENT WAS INSTRUCTED TO ISOLATE FROM NOW UNTIL THE SURGERY.    Anesthesia Alert  + Difficult Airway (CLASS IV)  NO--History of neck surgery or radiation  NO--Limited ROM of neck  NO--History of Malignant hyperthermia  NO--Personal or family history of Pseudocholinesterase deficiency  NO--Prior Anesthesia Complication  NO--Latex Allergy  NO--Loose teeth  NO--History of Rheumatoid Arthritis  NO--SAMANTHA  + BLEEDING RISK (H/O ITP)    RCRI=0  DASI=9.89 METS (ONLY LIMITED TO KNEE PAIN ) PATIENT DENIES CHEST PAIN, SHORTNESS OF BREATH, PALPITATIONS, COUGHING, FEVER, DYSURIA.    NO COUGH, FEVER, SORE THROAT, HEADACHE, LOSS OF TASTE OR SMELL. NO KNOWN EXPOSURE TO ANYONE WITH COVID. PATIENT WAS INSTRUCTED TO ISOLATE FROM NOW UNTIL THE SURGERY.    Anesthesia Alert  + Difficult Airway (CLASS IV)  NO--History of neck surgery or radiation  NO--Limited ROM of neck  NO--History of Malignant hyperthermia  NO--Personal or family history of Pseudocholinesterase deficiency  NO--Prior Anesthesia Complication  NO--Latex Allergy  NO--Loose teeth  NO--History of Rheumatoid Arthritis  NO--SAMANTHA  + BLEEDING RISK (H/O ITP)    RCRI=0  DASI=9.89 METS (ONLY LIMITED TO KNEE PAIN )    Opioid Risk Assessment Tool (Female)       Family history of substance abuse            Alcohol (0)            Illegal Drugs (0)            Prescription drugs (0)       Personal history of substance abuse            Alcohol (0)            Illegal Drugs (0)            Prescription drugs (0)       Age between 16-45 (0)       History of preadolescent sexual abuse (0)       Psychological disease (ADD, ADHD, OCD, Bipolar Disorder, Schizophrenia, Depression) (0)    Scoring Totals:  Low Risk (0)

## 2024-10-22 NOTE — H&P PST ADULT - NSICDXPASTMEDICALHX_GEN_ALL_CORE_FT
PAST MEDICAL HISTORY:  H/O hearing loss     History of ITP     Hypothyroidism     Macular degeneration     OA (osteoarthritis)

## 2024-10-22 NOTE — H&P PST ADULT - NSICDXFAMILYHX_GEN_ALL_CORE_FT
FAMILY HISTORY:  Mother  Still living? No  FH: CHF (congestive heart failure), Age at diagnosis: Age Unknown  FH: COPD (chronic obstructive pulmonary disease), Age at diagnosis: Age Unknown

## 2024-10-22 NOTE — H&P PST ADULT - NSICDXPASTSURGICALHX_GEN_ALL_CORE_FT
PAST SURGICAL HISTORY:  Cataract extraction status of right eye     Cataract extraction status, left eye     History of laparoscopic cholecystectomy     S/P      S/P carpal tunnel release     S/P UPPP (uvulopalatopharyngoplasty)

## 2024-10-22 NOTE — H&P PST ADULT - REASON FOR ADMISSION
79 Y/O FEMALE HERE FOR PRE-ADMISSION SURGICAL TESTING. H/O ITP, HYPOTHYROIDISM AND MACULAR DEGENERATION.  PATIENT REPORTS SHE STARTED TO HAVE LEFT KNEE PAIN IN 2021. SHE WAS DIAGNOSED WITH A BAKER'S CYST. SHE WENT TO AN ORTHOPEDIC. SHE TOOK MULTIPLE STEROID INJECTIONS AS WELL AS GEL INJECTIONS, BUT THE PAIN STARTED TO GET WORSE. PAIN IS 7/10, ACHY AND SHARP AT TIMES. SHE WAS TAKING ADVIL AS NEEDED, BUT IT WAS BOTHERING HER. SHE'S NOW TAKING ES TYLENOL WITH MINIMAL RELIEF. SHE REQUIRES THE USE OF A CANE. SHE STATES IT'S HARD TO WALK LONG DISTANCES.   NOW FOR SCHEDULED LEFT TOTAL KNEE REPLACEMENT.

## 2024-10-23 DIAGNOSIS — Z01.818 ENCOUNTER FOR OTHER PREPROCEDURAL EXAMINATION: ICD-10-CM

## 2024-10-23 DIAGNOSIS — M17.12 UNILATERAL PRIMARY OSTEOARTHRITIS, LEFT KNEE: ICD-10-CM

## 2024-10-24 ENCOUNTER — LABORATORY RESULT (OUTPATIENT)
Age: 79
End: 2024-10-24

## 2024-10-24 ENCOUNTER — APPOINTMENT (OUTPATIENT)
Age: 79
End: 2024-10-24

## 2024-10-24 ENCOUNTER — OUTPATIENT (OUTPATIENT)
Dept: OUTPATIENT SERVICES | Facility: HOSPITAL | Age: 79
LOS: 1 days | End: 2024-10-24
Payer: MEDICARE

## 2024-10-24 ENCOUNTER — APPOINTMENT (OUTPATIENT)
Dept: OBGYN | Facility: CLINIC | Age: 79
End: 2024-10-24
Payer: MEDICARE

## 2024-10-24 ENCOUNTER — NON-APPOINTMENT (OUTPATIENT)
Age: 79
End: 2024-10-24

## 2024-10-24 VITALS
BODY MASS INDEX: 26.46 KG/M2 | DIASTOLIC BLOOD PRESSURE: 89 MMHG | WEIGHT: 155 LBS | SYSTOLIC BLOOD PRESSURE: 158 MMHG | HEIGHT: 64 IN

## 2024-10-24 DIAGNOSIS — Z98.890 OTHER SPECIFIED POSTPROCEDURAL STATES: Chronic | ICD-10-CM

## 2024-10-24 DIAGNOSIS — D69.6 THROMBOCYTOPENIA, UNSPECIFIED: ICD-10-CM

## 2024-10-24 DIAGNOSIS — Z98.891 HISTORY OF UTERINE SCAR FROM PREVIOUS SURGERY: Chronic | ICD-10-CM

## 2024-10-24 DIAGNOSIS — Z01.419 ENCOUNTER FOR GYNECOLOGICAL EXAMINATION (GENERAL) (ROUTINE) W/OUT ABNORMAL FINDINGS: ICD-10-CM

## 2024-10-24 DIAGNOSIS — Z98.42 CATARACT EXTRACTION STATUS, LEFT EYE: Chronic | ICD-10-CM

## 2024-10-24 DIAGNOSIS — Z98.41 CATARACT EXTRACTION STATUS, RIGHT EYE: Chronic | ICD-10-CM

## 2024-10-24 PROCEDURE — 85027 COMPLETE CBC AUTOMATED: CPT

## 2024-10-24 PROCEDURE — 96372 THER/PROPH/DIAG INJ SC/IM: CPT

## 2024-10-24 PROCEDURE — G0101: CPT

## 2024-10-24 RX ORDER — ROMIPLOSTIM 125 UG/.25ML
140 INJECTION, POWDER, LYOPHILIZED, FOR SOLUTION SUBCUTANEOUS ONCE
Refills: 0 | Status: COMPLETED | OUTPATIENT
Start: 2024-10-24 | End: 2024-10-24

## 2024-10-24 RX ADMIN — ROMIPLOSTIM 140 MICROGRAM(S): 125 INJECTION, POWDER, LYOPHILIZED, FOR SOLUTION SUBCUTANEOUS at 15:10

## 2024-10-31 ENCOUNTER — LABORATORY RESULT (OUTPATIENT)
Age: 79
End: 2024-10-31

## 2024-10-31 ENCOUNTER — APPOINTMENT (OUTPATIENT)
Age: 79
End: 2024-10-31

## 2024-10-31 ENCOUNTER — OUTPATIENT (OUTPATIENT)
Dept: OUTPATIENT SERVICES | Facility: HOSPITAL | Age: 79
LOS: 1 days | End: 2024-10-31
Payer: MEDICARE

## 2024-10-31 DIAGNOSIS — Z98.42 CATARACT EXTRACTION STATUS, LEFT EYE: Chronic | ICD-10-CM

## 2024-10-31 DIAGNOSIS — Z98.41 CATARACT EXTRACTION STATUS, RIGHT EYE: Chronic | ICD-10-CM

## 2024-10-31 DIAGNOSIS — D69.6 THROMBOCYTOPENIA, UNSPECIFIED: ICD-10-CM

## 2024-10-31 DIAGNOSIS — Z98.890 OTHER SPECIFIED POSTPROCEDURAL STATES: Chronic | ICD-10-CM

## 2024-10-31 DIAGNOSIS — Z98.891 HISTORY OF UTERINE SCAR FROM PREVIOUS SURGERY: Chronic | ICD-10-CM

## 2024-10-31 PROBLEM — H35.30 UNSPECIFIED MACULAR DEGENERATION: Chronic | Status: ACTIVE | Noted: 2024-10-22

## 2024-10-31 PROBLEM — M19.90 UNSPECIFIED OSTEOARTHRITIS, UNSPECIFIED SITE: Chronic | Status: ACTIVE | Noted: 2024-10-22

## 2024-10-31 PROBLEM — Z86.2 PERSONAL HISTORY OF DISEASES OF THE BLOOD AND BLOOD-FORMING ORGANS AND CERTAIN DISORDERS INVOLVING THE IMMUNE MECHANISM: Chronic | Status: ACTIVE | Noted: 2024-10-22

## 2024-10-31 PROBLEM — E03.9 HYPOTHYROIDISM, UNSPECIFIED: Chronic | Status: ACTIVE | Noted: 2024-10-22

## 2024-10-31 LAB
HCT VFR BLD CALC: 35.3 %
HGB BLD-MCNC: 12 G/DL
MCHC RBC-ENTMCNC: 30.3 PG
MCHC RBC-ENTMCNC: 34 G/DL
MCV RBC AUTO: 89.1 FL
PLATELET # BLD AUTO: 60 K/UL
PMV BLD: 10.4 FL
RBC # BLD: 3.96 M/UL
RBC # FLD: 13.1 %
WBC # FLD AUTO: 7.06 K/UL

## 2024-10-31 PROCEDURE — 36416 COLLJ CAPILLARY BLOOD SPEC: CPT

## 2024-10-31 PROCEDURE — 85027 COMPLETE CBC AUTOMATED: CPT

## 2024-11-01 LAB
HCT VFR BLD CALC: 35.3 %
HGB BLD-MCNC: 12.2 G/DL
MCHC RBC-ENTMCNC: 30.6 PG
MCHC RBC-ENTMCNC: 34.6 G/DL
MCV RBC AUTO: 88.5 FL
PLATELET # BLD AUTO: 134 K/UL
PMV BLD: 10.5 FL
RBC # BLD: 3.99 M/UL
RBC # FLD: 13.1 %
WBC # FLD AUTO: 7.76 K/UL

## 2024-11-07 ENCOUNTER — LABORATORY RESULT (OUTPATIENT)
Age: 79
End: 2024-11-07

## 2024-11-07 ENCOUNTER — APPOINTMENT (OUTPATIENT)
Age: 79
End: 2024-11-07
Payer: MEDICARE

## 2024-11-07 ENCOUNTER — OUTPATIENT (OUTPATIENT)
Dept: OUTPATIENT SERVICES | Facility: HOSPITAL | Age: 79
LOS: 1 days | End: 2024-11-07
Payer: MEDICARE

## 2024-11-07 DIAGNOSIS — Z98.41 CATARACT EXTRACTION STATUS, RIGHT EYE: Chronic | ICD-10-CM

## 2024-11-07 DIAGNOSIS — D69.6 THROMBOCYTOPENIA, UNSPECIFIED: ICD-10-CM

## 2024-11-07 DIAGNOSIS — Z98.42 CATARACT EXTRACTION STATUS, LEFT EYE: Chronic | ICD-10-CM

## 2024-11-07 DIAGNOSIS — Z51.81 ENCOUNTER FOR THERAPEUTIC DRUG LVL MONITORING: ICD-10-CM

## 2024-11-07 DIAGNOSIS — Z98.891 HISTORY OF UTERINE SCAR FROM PREVIOUS SURGERY: Chronic | ICD-10-CM

## 2024-11-07 DIAGNOSIS — Z98.890 OTHER SPECIFIED POSTPROCEDURAL STATES: Chronic | ICD-10-CM

## 2024-11-07 PROBLEM — Z86.69 PERSONAL HISTORY OF OTHER DISEASES OF THE NERVOUS SYSTEM AND SENSE ORGANS: Chronic | Status: ACTIVE | Noted: 2024-10-22

## 2024-11-07 PROCEDURE — 99213 OFFICE O/P EST LOW 20 MIN: CPT

## 2024-11-07 PROCEDURE — 99213 OFFICE O/P EST LOW 20 MIN: CPT | Mod: 25

## 2024-11-07 PROCEDURE — 85027 COMPLETE CBC AUTOMATED: CPT

## 2024-11-07 PROCEDURE — 96372 THER/PROPH/DIAG INJ SC/IM: CPT

## 2024-11-07 RX ORDER — ROMIPLOSTIM 250 UG/.5ML
140 INJECTION, POWDER, LYOPHILIZED, FOR SOLUTION SUBCUTANEOUS ONCE
Refills: 0 | Status: ACTIVE | OUTPATIENT
Start: 2024-11-07

## 2024-11-08 DIAGNOSIS — D69.6 THROMBOCYTOPENIA, UNSPECIFIED: ICD-10-CM

## 2024-11-08 LAB
HCT VFR BLD CALC: 37.4 %
HGB BLD-MCNC: 12.9 G/DL
MCHC RBC-ENTMCNC: 30.4 PG
MCHC RBC-ENTMCNC: 34.5 G/DL
MCV RBC AUTO: 88 FL
PLATELET # BLD AUTO: 74 K/UL
PMV BLD: 10.1 FL
RBC # BLD: 4.25 M/UL
RBC # FLD: 12.7 %
WBC # FLD AUTO: 7.62 K/UL

## 2024-11-12 ENCOUNTER — APPOINTMENT (OUTPATIENT)
Age: 79
End: 2024-11-12

## 2024-11-14 ENCOUNTER — OUTPATIENT (OUTPATIENT)
Dept: OUTPATIENT SERVICES | Facility: HOSPITAL | Age: 79
LOS: 1 days | End: 2024-11-14
Payer: MEDICARE

## 2024-11-14 ENCOUNTER — APPOINTMENT (OUTPATIENT)
Age: 79
End: 2024-11-14

## 2024-11-14 ENCOUNTER — LABORATORY RESULT (OUTPATIENT)
Age: 79
End: 2024-11-14

## 2024-11-14 DIAGNOSIS — D69.6 THROMBOCYTOPENIA, UNSPECIFIED: ICD-10-CM

## 2024-11-14 DIAGNOSIS — Z98.41 CATARACT EXTRACTION STATUS, RIGHT EYE: Chronic | ICD-10-CM

## 2024-11-14 DIAGNOSIS — Z98.42 CATARACT EXTRACTION STATUS, LEFT EYE: Chronic | ICD-10-CM

## 2024-11-14 DIAGNOSIS — Z98.890 OTHER SPECIFIED POSTPROCEDURAL STATES: Chronic | ICD-10-CM

## 2024-11-14 DIAGNOSIS — Z98.891 HISTORY OF UTERINE SCAR FROM PREVIOUS SURGERY: Chronic | ICD-10-CM

## 2024-11-14 PROCEDURE — 85027 COMPLETE CBC AUTOMATED: CPT

## 2024-11-14 PROCEDURE — 96372 THER/PROPH/DIAG INJ SC/IM: CPT

## 2024-11-14 RX ORDER — ROMIPLOSTIM 250 UG/.5ML
140 INJECTION, POWDER, LYOPHILIZED, FOR SOLUTION SUBCUTANEOUS ONCE
Refills: 0 | Status: COMPLETED | OUTPATIENT
Start: 2024-11-14 | End: 2024-11-14

## 2024-11-14 RX ADMIN — ROMIPLOSTIM 140 MICROGRAM(S): 250 INJECTION, POWDER, LYOPHILIZED, FOR SOLUTION SUBCUTANEOUS at 15:21

## 2024-11-15 LAB
HCT VFR BLD CALC: 34.5 %
HGB BLD-MCNC: 12.1 G/DL
MCHC RBC-ENTMCNC: 30.8 PG
MCHC RBC-ENTMCNC: 35.1 G/DL
MCV RBC AUTO: 87.8 FL
PLATELET # BLD AUTO: 94 K/UL
PMV BLD: 10.4 FL
RBC # BLD: 3.93 M/UL
RBC # FLD: 12.6 %
WBC # FLD AUTO: 7.8 K/UL

## 2024-11-18 ENCOUNTER — APPOINTMENT (OUTPATIENT)
Dept: ORTHOPEDIC SURGERY | Facility: HOSPITAL | Age: 79
End: 2024-11-18

## 2024-11-18 ENCOUNTER — INPATIENT (INPATIENT)
Facility: HOSPITAL | Age: 79
LOS: 0 days | Discharge: HOME HEALTH | DRG: 554 | End: 2024-11-19
Attending: ORTHOPAEDIC SURGERY | Admitting: ORTHOPAEDIC SURGERY
Payer: MEDICARE

## 2024-11-18 ENCOUNTER — RESULT REVIEW (OUTPATIENT)
Age: 79
End: 2024-11-18

## 2024-11-18 VITALS
RESPIRATION RATE: 17 BRPM | DIASTOLIC BLOOD PRESSURE: 78 MMHG | SYSTOLIC BLOOD PRESSURE: 152 MMHG | OXYGEN SATURATION: 99 % | HEIGHT: 66 IN | WEIGHT: 153 LBS | HEART RATE: 70 BPM | TEMPERATURE: 98 F

## 2024-11-18 DIAGNOSIS — Z98.890 OTHER SPECIFIED POSTPROCEDURAL STATES: Chronic | ICD-10-CM

## 2024-11-18 DIAGNOSIS — Z98.41 CATARACT EXTRACTION STATUS, RIGHT EYE: Chronic | ICD-10-CM

## 2024-11-18 DIAGNOSIS — M17.12 UNILATERAL PRIMARY OSTEOARTHRITIS, LEFT KNEE: ICD-10-CM

## 2024-11-18 DIAGNOSIS — Z98.891 HISTORY OF UTERINE SCAR FROM PREVIOUS SURGERY: Chronic | ICD-10-CM

## 2024-11-18 DIAGNOSIS — Z98.42 CATARACT EXTRACTION STATUS, LEFT EYE: Chronic | ICD-10-CM

## 2024-11-18 LAB
BASOPHILS # BLD AUTO: 0.07 K/UL — SIGNIFICANT CHANGE UP (ref 0–0.2)
BASOPHILS NFR BLD AUTO: 1.1 % — HIGH (ref 0–1)
EOSINOPHIL # BLD AUTO: 0.36 K/UL — SIGNIFICANT CHANGE UP (ref 0–0.7)
EOSINOPHIL NFR BLD AUTO: 5.6 % — SIGNIFICANT CHANGE UP (ref 0–8)
GLUCOSE BLDC GLUCOMTR-MCNC: 111 MG/DL — HIGH (ref 70–99)
HCT VFR BLD CALC: 35.9 % — LOW (ref 37–47)
HGB BLD-MCNC: 12.1 G/DL — SIGNIFICANT CHANGE UP (ref 12–16)
IMM GRANULOCYTES NFR BLD AUTO: 0.3 % — SIGNIFICANT CHANGE UP (ref 0.1–0.3)
LYMPHOCYTES # BLD AUTO: 1.6 K/UL — SIGNIFICANT CHANGE UP (ref 1.2–3.4)
LYMPHOCYTES # BLD AUTO: 25 % — SIGNIFICANT CHANGE UP (ref 20.5–51.1)
MCHC RBC-ENTMCNC: 30.3 PG — SIGNIFICANT CHANGE UP (ref 27–31)
MCHC RBC-ENTMCNC: 33.7 G/DL — SIGNIFICANT CHANGE UP (ref 32–37)
MCV RBC AUTO: 89.8 FL — SIGNIFICANT CHANGE UP (ref 81–99)
MONOCYTES # BLD AUTO: 0.44 K/UL — SIGNIFICANT CHANGE UP (ref 0.1–0.6)
MONOCYTES NFR BLD AUTO: 6.9 % — SIGNIFICANT CHANGE UP (ref 1.7–9.3)
NEUTROPHILS # BLD AUTO: 3.91 K/UL — SIGNIFICANT CHANGE UP (ref 1.4–6.5)
NEUTROPHILS NFR BLD AUTO: 61.1 % — SIGNIFICANT CHANGE UP (ref 42.2–75.2)
NRBC # BLD: 0 /100 WBCS — SIGNIFICANT CHANGE UP (ref 0–0)
PLATELET # BLD AUTO: 108 K/UL — LOW (ref 130–400)
PMV BLD: 10.4 FL — SIGNIFICANT CHANGE UP (ref 7.4–10.4)
RBC # BLD: 4 M/UL — LOW (ref 4.2–5.4)
RBC # FLD: 12.5 % — SIGNIFICANT CHANGE UP (ref 11.5–14.5)
WBC # BLD: 6.4 K/UL — SIGNIFICANT CHANGE UP (ref 4.8–10.8)
WBC # FLD AUTO: 6.4 K/UL — SIGNIFICANT CHANGE UP (ref 4.8–10.8)

## 2024-11-18 PROCEDURE — C1776: CPT

## 2024-11-18 PROCEDURE — 36415 COLL VENOUS BLD VENIPUNCTURE: CPT

## 2024-11-18 PROCEDURE — 97110 THERAPEUTIC EXERCISES: CPT | Mod: GP

## 2024-11-18 PROCEDURE — 85027 COMPLETE CBC AUTOMATED: CPT

## 2024-11-18 PROCEDURE — 73560 X-RAY EXAM OF KNEE 1 OR 2: CPT | Mod: LT

## 2024-11-18 PROCEDURE — 88305 TISSUE EXAM BY PATHOLOGIST: CPT | Mod: 26

## 2024-11-18 PROCEDURE — C1889: CPT

## 2024-11-18 PROCEDURE — 97162 PT EVAL MOD COMPLEX 30 MIN: CPT | Mod: GP

## 2024-11-18 PROCEDURE — 94010 BREATHING CAPACITY TEST: CPT

## 2024-11-18 PROCEDURE — 88311 DECALCIFY TISSUE: CPT

## 2024-11-18 PROCEDURE — 88311 DECALCIFY TISSUE: CPT | Mod: 26

## 2024-11-18 PROCEDURE — 88305 TISSUE EXAM BY PATHOLOGIST: CPT

## 2024-11-18 PROCEDURE — 97116 GAIT TRAINING THERAPY: CPT | Mod: GP

## 2024-11-18 PROCEDURE — 80048 BASIC METABOLIC PNL TOTAL CA: CPT

## 2024-11-18 PROCEDURE — C1713: CPT

## 2024-11-18 PROCEDURE — 27447 TOTAL KNEE ARTHROPLASTY: CPT | Mod: LT

## 2024-11-18 PROCEDURE — 97165 OT EVAL LOW COMPLEX 30 MIN: CPT | Mod: GO

## 2024-11-18 PROCEDURE — 73560 X-RAY EXAM OF KNEE 1 OR 2: CPT | Mod: 26,LT

## 2024-11-18 RX ORDER — LEVOTHYROXINE SODIUM 150 MCG
88 TABLET ORAL DAILY
Refills: 0 | Status: DISCONTINUED | OUTPATIENT
Start: 2024-11-18 | End: 2024-11-19

## 2024-11-18 RX ORDER — CHLORHEXIDINE GLUCONATE 1.2 MG/ML
1 RINSE ORAL
Refills: 0 | Status: DISCONTINUED | OUTPATIENT
Start: 2024-11-18 | End: 2024-11-19

## 2024-11-18 RX ORDER — DEXAMETHASONE 1.5 MG/1
2 TABLET ORAL ONCE
Refills: 0 | Status: COMPLETED | OUTPATIENT
Start: 2024-11-19 | End: 2024-11-19

## 2024-11-18 RX ORDER — SODIUM CHLORIDE 9 MG/ML
1000 INJECTION, SOLUTION INTRAMUSCULAR; INTRAVENOUS; SUBCUTANEOUS
Refills: 0 | Status: DISCONTINUED | OUTPATIENT
Start: 2024-11-18 | End: 2024-11-19

## 2024-11-18 RX ORDER — ACETAMINOPHEN 500MG 500 MG/1
650 TABLET, COATED ORAL EVERY 6 HOURS
Refills: 0 | Status: DISCONTINUED | OUTPATIENT
Start: 2024-11-18 | End: 2024-11-19

## 2024-11-18 RX ORDER — TRAMADOL HYDROCHLORIDE 300 MG/1
50 CAPSULE ORAL EVERY 4 HOURS
Refills: 0 | Status: DISCONTINUED | OUTPATIENT
Start: 2024-11-18 | End: 2024-11-19

## 2024-11-18 RX ORDER — POLYETHYLENE GLYCOL 3350 17 G/17G
17 POWDER, FOR SOLUTION ORAL AT BEDTIME
Refills: 0 | Status: DISCONTINUED | OUTPATIENT
Start: 2024-11-18 | End: 2024-11-19

## 2024-11-18 RX ORDER — MAGNESIUM, ALUMINUM HYDROXIDE 200-225/5
30 SUSPENSION, ORAL (FINAL DOSE FORM) ORAL
Refills: 0 | Status: DISCONTINUED | OUTPATIENT
Start: 2024-11-18 | End: 2024-11-19

## 2024-11-18 RX ORDER — PANTOPRAZOLE SODIUM 40 MG/1
40 TABLET, DELAYED RELEASE ORAL
Refills: 0 | Status: DISCONTINUED | OUTPATIENT
Start: 2024-11-18 | End: 2024-11-19

## 2024-11-18 RX ORDER — SENNOSIDES 8.6 MG
2 TABLET ORAL AT BEDTIME
Refills: 0 | Status: DISCONTINUED | OUTPATIENT
Start: 2024-11-18 | End: 2024-11-19

## 2024-11-18 RX ORDER — CEFAZOLIN SODIUM 10 G
2000 VIAL (EA) INJECTION EVERY 8 HOURS
Refills: 0 | Status: COMPLETED | OUTPATIENT
Start: 2024-11-18 | End: 2024-11-19

## 2024-11-18 RX ORDER — ONDANSETRON HYDROCHLORIDE 4 MG/1
4 TABLET, FILM COATED ORAL ONCE
Refills: 0 | Status: DISCONTINUED | OUTPATIENT
Start: 2024-11-18 | End: 2024-11-18

## 2024-11-18 RX ORDER — ONDANSETRON HYDROCHLORIDE 4 MG/1
4 TABLET, FILM COATED ORAL EVERY 6 HOURS
Refills: 0 | Status: DISCONTINUED | OUTPATIENT
Start: 2024-11-18 | End: 2024-11-19

## 2024-11-18 RX ORDER — ACETAMINOPHEN 500MG 500 MG/1
1000 TABLET, COATED ORAL ONCE
Refills: 0 | Status: COMPLETED | OUTPATIENT
Start: 2024-11-18 | End: 2024-11-18

## 2024-11-18 RX ORDER — 0.9 % SODIUM CHLORIDE 0.9 %
1000 INTRAVENOUS SOLUTION INTRAVENOUS
Refills: 0 | Status: DISCONTINUED | OUTPATIENT
Start: 2024-11-18 | End: 2024-11-18

## 2024-11-18 RX ADMIN — POLYETHYLENE GLYCOL 3350 17 GRAM(S): 17 POWDER, FOR SOLUTION ORAL at 21:36

## 2024-11-18 RX ADMIN — ACETAMINOPHEN 500MG 1000 MILLIGRAM(S): 500 TABLET, COATED ORAL at 13:16

## 2024-11-18 RX ADMIN — Medication 81 MILLIGRAM(S): at 21:33

## 2024-11-18 RX ADMIN — ACETAMINOPHEN 500MG 1000 MILLIGRAM(S): 500 TABLET, COATED ORAL at 12:47

## 2024-11-18 RX ADMIN — Medication 2 TABLET(S): at 21:33

## 2024-11-18 RX ADMIN — ACETAMINOPHEN 500MG 650 MILLIGRAM(S): 500 TABLET, COATED ORAL at 18:05

## 2024-11-18 NOTE — BRIEF OPERATIVE NOTE - PRIMARY SURGEON
Spoke to mom and rescheduled appt.    <<----- Click to Select Surgeon Saeid-Christopher Hugo (Attending)

## 2024-11-18 NOTE — PHYSICAL THERAPY INITIAL EVALUATION ADULT - PERTINENT HX OF CURRENT PROBLEM, REHAB EVAL
Pt is a 78 y/o female with dx of elective L TKR with h/o L knee OA under regional anesthesia seen pod #0.

## 2024-11-18 NOTE — ASU PREOP CHECKLIST - WARM FLUIDS/WARM BLANKETS
[facility]  Dr. Marietta Sr  Discharge Summary     Derek Chavarria is a 73 y.o. male underwent right total knee replacement procedure without complication. Derek Chavarria was admitted to the floor following his   recovery in the PACU.      Discharge Diagnosis   Right Knee Replacement    Current Inpatient Medications    Current Facility-Administered Medications: iron polysaccharides (NIFEREX) capsule 150 mg, 150 mg, Oral, Daily  acetaminophen (TYLENOL) tablet 650 mg, 650 mg, Oral, 3 times per day  polyethylene glycol (GLYCOLAX) packet 17 g, 17 g, Oral, Daily  dexamethasone (DECADRON) injection 10 mg, 10 mg, Intravenous, Once  fluticasone (FLONASE) 50 MCG/ACT nasal spray 2 spray, 2 spray, Nasal, Daily PRN  hydrochlorothiazide (HYDRODIURIL) tablet 25 mg, 25 mg, Oral, Daily  cetirizine (ZYRTEC) tablet 5 mg, 5 mg, Oral, Daily  niacin (NIASPAN) extended release tablet 1,000 mg, 1,000 mg, Oral, Nightly  0.9 % sodium chloride infusion, , Intravenous, Continuous  0.9 % sodium chloride bolus, 500 mL, Intravenous, PRN  sodium chloride flush 0.9 % injection 10 mL, 10 mL, Intravenous, 2 times per day  sodium chloride flush 0.9 % injection 10 mL, 10 mL, Intravenous, PRN  docusate sodium (COLACE) capsule 100 mg, 100 mg, Oral, BID  ondansetron (ZOFRAN) injection 4 mg, 4 mg, Intravenous, Q6H PRN  rivaroxaban (XARELTO) tablet 10 mg, 10 mg, Oral, Daily  oxyCODONE (OXYCONTIN) extended release tablet 10 mg, 10 mg, Oral, 2 times per day  oxyCODONE (ROXICODONE) immediate release tablet 10 mg, 10 mg, Oral, Q4H PRN **OR** oxyCODONE (ROXICODONE) immediate release tablet 20 mg, 20 mg, Oral, Q4H PRN  diazepam (VALIUM) tablet 5 mg, 5 mg, Oral, Q6H PRN  traMADol (ULTRAM) tablet 50 mg, 50 mg, Oral, Q6H  HYDROmorphone (DILAUDID) injection 0.5 mg, 0.5 mg, Intravenous, Q3H PRN **OR** HYDROmorphone (DILAUDID) injection 1 mg, 1 mg, Intravenous, Q3H PRN  HYDROmorphone (DILAUDID) injection 1 mg, 1 mg, Intravenous, Q3H PRN **OR** HYDROmorphone (DILAUDID) injection 2 mg, 2 mg, Intravenous, Q3H PRN  diphenhydrAMINE (BENADRYL) tablet 25 mg, 25 mg, Oral, Q6H PRN  clindamycin (CLEOCIN) 900 mg in dextrose 5 % 50 mL IVPB, 900 mg, Intravenous, Q8H  tamsulosin (FLOMAX) capsule 0.4 mg, 0.4 mg, Oral, Daily    Post-operatively the patients diet was advanced as tolerated and their incision was checked on POD #1. The incision was clean, dry and intact with no signs of infection. The patient remained neurovascularly intact in the lower extremity and had intact pulses distally. Patients calf remained soft and showed no evidence of DVT. The patient was able to move his right leg and ankle/foot without any problems post-operatively. Physical therapy and occupational therapy were consulted and began working with the patient post-operatively. The patient progressed with PT/OT as would be expected and continued to improve through their stay. The patients pain was initially controlled with IV medications but we were able to transition to oral pain medications soon after arrival to the floor and their pain remained under good control through their hospital stay. From a medical standpoint the patient remained stable and continued to have the medicine team follow throughout their stay. Acute postoperative blood loss anemia after joint replacement being monitored with daily hemoglobin/hematocrit. The patients dressing was changed/incison was checked on day of d/c. The patient will be discharged at this time to  Home with [address] [place] [address]   with their current diet restrictions and will continue to follow the total knee precautions outlined to them by us and PT/OT. Condition on Discharge: Stable    Plan  Followup at scheduled appointment time (1 month post-op).   Patient was instructed on the use of pain medications, the signs and symptoms of infection, and was given our number to call should they have any questions or concerns following discharge. no

## 2024-11-18 NOTE — PHYSICAL THERAPY INITIAL EVALUATION ADULT - GENERAL OBSERVATIONS, REHAB EVAL
19:45-20:15. Chart reviewed; confirmed with RN to see the pt for PT. Pt ready for PT; received in bed with complain of 2/10 pain in L knee. +hep lock, +ace bandage L knee. Agreeable for PT evaluation.

## 2024-11-18 NOTE — ASU PATIENT PROFILE, ADULT - FALL HARM RISK - HARM RISK INTERVENTIONS

## 2024-11-18 NOTE — ASU PREOP CHECKLIST - RESPIRATORY RATE (BREATHS/MIN)
no lesions,  no deformities,  no traumatic injuries,  no significant scars are present,  chest wall non-tender,  no masses present, breathing is unlabored without accessory muscle use, normal breath sounds 17

## 2024-11-18 NOTE — PHYSICAL THERAPY INITIAL EVALUATION ADULT - ASR WT BEARING STATUS EVAL
[facility] MEDICINE PROGRESS NOTE    Patient: Dayron Harkins Today's Date: 12/9/2021   YOB: 1960 Admission Date: 12/7/2021  1:02 PM   MRN: [id number] Inpatient LOS: 2 day(s)   Room:  [address] Hospital Day:  Hospital Day: 3       History and Subjective complaints     Chief Complaint:   JOSIE/ckd3 and right foot OM    Interval history and Overnight events:    Patient resting in bed. D/w patient about xrays with om second digit. Patient seen by ortho who note this is likely chronic and recommend no surgery at this time and outpatient podiatry follow up. D/w patient and ID consulted for guidance with abx management and wound care consulted to see as well. Patient followed by hepatology and nephrology for etoh cirrhosis with JOSIE/ckd3. Pt baseline cr 1.7 and cr currently around baseline. Patient leg swelling improving. Patient seen by transplant team and not transplant candidate secondary to ongoing etohism. Patient recent paracentesis neg for sbp.    Patient seen and examined by me in follow up.      Hospital Course  Patients interval history reviewed/EHR notes reviewed.   Dayron Harkins is a 61 year old male who presented on 12/7/2021 with complaints of Abdominal Pain         Reviewed Pertinent Histories: Medical History, Surgical History, Social History, Family History,     ROS: 12-point review of systems negative except as above.    Medications: Reviewed     Scheduled Medications:    Potassium Standard Replacement Protocol, , See Admin Instructions  Magnesium Standard Replacement Protocol, , See Admin Instructions  Phosphorus Standard Replacement Protocol, , See Admin Instructions  vancomycin (VANCOCIN) IVPB, 750 mg, 2 times per day  atorvastatin, 80 mg, Daily  carvedilol, 3.125 mg, 2 times per day  folic acid, 1 mg, Daily  [Held by provider] furosemide, 20 mg, Daily  insulin lispro, 10 Units, TID AC  insulin glargine, 10 Units, Daily  lactulose, 40 g, BID  pantoprazole, 40  mg, BID  polyethylene glycol, 17 g, Daily  tamsulosin, 0.4 mg, Daily  thiamine, 100 mg, Daily  sodium chloride (PF), 2 mL, 2 times per day  enoxaparin, 40 mg, Daily  VANCOMYCIN - PHARMACIST MONITORED, , See Admin Instructions  cefepime (MAXIPIME) IVPB, 1,000 mg, 2 times per day      Continuous Infusions:    PRN Medications:  melatonin, prochlorperazine, acetaminophen, docusate sodium-sennosides, bisacodyl, sodium chloride, dextrose, dextrose, glucagon, dextrose, dextrose, sodium chloride, sodium chloride, ondansetron, polyethylene glycol, aluminum-magnesium hydroxide-simethicone, albumin human 25%, albumin human 25%      Physical Examination       Vital 24 Hour Range Most Recent Value   Temperature Temp  Min: 97.9 °F (36.6 °C)  Max: 98.6 °F (37 °C) 98.6 °F (37 °C)   Pulse Pulse  Min: 78  Max: 88 88   Respiratory Resp  Min: 16  Max: 16 16   Blood Pressure BP  Min: 135/76  Max: 168/95 135/76   Pulse Oximetry SpO2  Min: 98 %  Max: 99 % 99 %   Arterial BP No data recorded     O2 No data recorded       Intake and Output:      Intake/Output Summary (Last 24 hours) at 12/9/2021 1545  Last data filed at 12/9/2021 1400  Gross per 24 hour   Intake 1280 ml   Output --   Net 1280 ml       Last Stool Occurrence:       Vital Most Recent Value First Value   Weight 86.2 kg (190 lb) Weight: 86.8 kg (191 lb 5.8 oz)   Height 5' 6\" (167.6 cm) Height: 5' 5\" (165.1 cm)   BMI 30.67 N/A         General:  No distress  Head:  Conjunctivae are clear  Neck:  Supple  Lungs:  Symmetric chest wall expansion, unlabored respirations  Cardiac:  Normal   Abdomen:  Soft, not distended  Extremities/Musculoskeletal:  Right 2nd toe with swelling/induration  Skin:  No rash   Neurologic:  Alert, moving all extremities  Psychiatric:  Pleasant, cooperative        Test Results     LABS AND STUDIES:      Recent Labs   Lab 12/08/21  1751 12/08/21  2230 12/09/21  0647 12/09/21  0833 12/09/21  1213   GLUCOSE BEDSIDE 163* 114* 155* 159* 103*       Recent Labs      11/10/21  1322 12/07/21  1605 12/08/21  0655   WBC 2.8* 2.9* 2.4*   HGB 9.0* 9.4* 8.1*   MCV 83.9 86.1 85.7   PLT 79* 84* 72*       Recent Labs     07/04/21  0405 07/05/21  0546 11/10/21  1410 12/07/21  1605 12/08/21  0655 12/09/21  0607   SODIUM 134*   < > 130*   < > 136 136   POTASSIUM 3.9   < > 3.8   < > 3.9 4.0   CHLORIDE 106   < > 94*   < > 105 105   CO2 23   < > 31   < > 25 25   BUN 32*   < > 25*   < > 19 21*   CREATININE 1.63*   < > 1.83*   < > 1.74* 1.75*   GLUCOSE 189*   < > 264*   < > 178* 170*   CALCIUM 7.2*   < > 8.6   < > 8.3* 8.0*   MG 1.7  --  2.0  --   --   --    PHOS 2.1*  --   --   --  3.3  --     < > = values in this interval not displayed.                Recent Labs     07/02/21  0342 08/03/21  1540 10/29/21  1011 11/10/21  1410 12/07/21  1605   RAPDTR <0.02  --   --   --   --    INR 1.3   < > 1.2 1.2 1.1    < > = values in this interval not displayed.       Recent Labs     07/03/21  0512   HGBA1C 6.9*             Recent Labs     08/03/21  1540 08/04/21  0629 08/25/21  1500 10/29/21  1011 11/10/21  1410 12/07/21  1605   GPT 45   < > 75*   < > 26 30   AST 42*   < > 56*   < > 39* 35   BILIRUBIN 0.6   < > 0.6   < > 0.8 0.8   ALBUMIN 2.9*   < > 3.1*   < > 2.7* 2.6*   LIPA 239  --  238  --  189  --     < > = values in this interval not displayed.       Recent Labs     03/08/21  0223 04/29/21  1207 12/09/21  0607   IRON   < >  --  28*   TIBC   < >  --  278   FERR  --  14*  --    VB12  --   --  1,970*   CAROLINA  --   --  14.7    < > = values in this interval not displayed.       Recent Labs   Lab 12/08/21  1256   USPG 1.020   UPH 7.5*   UKET Negative   UPROT 100 *   UGLU Negative   UBILI Negative   UROB 1.0   UWBC Negative   UNITR Negative   URBC Moderate*   KATELYNN 120   UCR 58.40       Recent Labs     12/07/21  1605   CRP 1.8*       Results for orders placed or performed during the hospital encounter of 08/03/21   ECG   Result Value Ref Range    Ventricular Rate EKG/Min (BPM) 84     Atrial Rate (BPM) 84      MS-Interval (MSEC) 164     QRS-Interval (MSEC) 74     QT-Interval (MSEC) 392     QTc 463     P Axis (Degrees) 29     R Axis (Degrees) 0     T Axis (Degrees) 24     REPORT TEXT       Normal sinus rhythm  Normal ECG  When compared with ECG of  02-JUL-2021 03:31,  No significant change was found  Confirmed by HETAL VEGA, NAPOLEON ([id number]),  Lurdes Atwood ([id number]) on 8/3/2021 8:03:48 PM             RADIOLOGY:    Imaging:  EHR reviewed.        Tubes, Devices, Monitoring     Telemetry: Off      Ventura: as noted in Epic    Assessment and Plan       Assessment:  Dayron Harkins is a 61 year old male who presents with etoh cirrhosis and ckd3 and found with increased swelling/edema and JOSIE/ckd3 and right 2nd toe OM.    Plan:  Principal Problem:    Ascites  Active Problems:    Cirrhosis of liver (CMS/HCC)    Diabetic foot ulcer (CMS/HCC)    Stage 3a chronic kidney disease (CMS/HCC)    Debility     D/w patient about xrays with om second digit. Patient seen by ortho who note this is likely chronic and recommend no surgery at this time and outpatient podiatry follow up. D/w patient and ID consulted for guidance with abx management and wound care consulted to see as well. Patient followed by hepatology and nephrology for etoh cirrhosis with JOSIE/ckd3. Pt baseline cr 1.7 and cr currently around baseline. Patient leg swelling improving. Patient seen by transplant team and not transplant candidate secondary to ongoing etohism. Patient recent paracentesis neg for sbp. Patient currently on vanco/cefepime for OM foot pending further ID recommendations.    For other chronic medical conditions  Patient is otherwise stable and continue current regimen with outpatient PCP follow up after discharge.        Code Status:  Full Resuscitation      Consults:    Please see treatment team in [facility]    Diet:  One Time Diet Consistent Carb Moderate (45-75 Gm/meal); Please Send Lunch Tray For Boarder Pt To [facility] Asap Thank You  Consistent Carb  Moderate (45-75 Gm/meal) Diet    Therapy Orders:  Epic reviewed    Smoking status- Epic reviewed    Nutrition status- Epic reviewed    Body mass index is 30.67 kg/m².     DVT Prophylaxis:  VTE Pharmacologic Prophylaxis:  Yes  VTE Mechanical Prophylaxis:  Yes    Limited English proficiency with  not needed as patient proficient in English unless otherwise mentioned above           Advanced Directives     Code Status: Full Resuscitation           Discharge Plan     The patients treatment plans were discussed with patient.     Recommendations for Discharge   SW     PT     OT     SLP       Anticipated discharge destination: Home  Expected Discharge Date: 12/11     Barriers to Discharge: Patient is not medically ready and needs to remain in the hospital today due to OM foot, decompensated cirrhosis      Karol Mercado MD   Hospitalist  12/9/2021  3:45 PM    (Contact by secure chat)      Left LE

## 2024-11-18 NOTE — CHART NOTE - NSCHARTNOTEFT_GEN_A_CORE
discussed with dr alonso   aspirin 81mg every 12 hours ok   will monitor for bleeding   they will  schedule her injections sooner   am cbc
PACU ANESTHESIA ADMISSION NOTE      Procedure:   Post op diagnosis:      ____  Intubated  TV:______       Rate: ______      FiO2: ______    __x__  Patent Airway    __x__  Full return of protective reflexes    ____  Full recovery from anesthesia / back to baseline     Vitals:   T:  97.1         R: 18                 BP:  152/66                Sat: 98                  P: 62      Mental Status:  __x__ Awake   __x___ Alert   _____ Drowsy   _____ Sedated    Nausea/Vomiting:  __x__ NO  ______Yes,   See Post - Op Orders          Pain Scale (0-10):  ___0__    Treatment: ____ None    ____ See Post - Op/PCA Orders    Post - Operative Fluids:   ____ Oral   _x__ See Post - Op Orders    Plan: Discharge:   ____Home       _x____Floor     _____Critical Care    _____  Other:_________________    Comments:

## 2024-11-18 NOTE — PHYSICAL THERAPY INITIAL EVALUATION ADULT - LIVES WITH, PROFILE
lives in a private home with 4 steps to enter through the side entrance with R handrail going up and no steps inside with bathroom and bedroom on same floor./spouse

## 2024-11-18 NOTE — ASU PATIENT PROFILE, ADULT - NS PRO AD PATIENT TYPE
Patient admitted as Observation/OPIB with an anticipated short hospitalization length of stay. Chart reviewed and it appears that patient has minimal needs for discharge at this time. Discussed with patients nurse and requested that case management be notified if discharge needs are identified. *Case management will continue to follow progress and update discharge plan as needed.     Electronically signed by TISHA Raygoza on 8/21/2020 at 8:51 AM
Health Care Proxy (HCP)

## 2024-11-19 ENCOUNTER — TRANSCRIPTION ENCOUNTER (OUTPATIENT)
Age: 79
End: 2024-11-19

## 2024-11-19 VITALS
DIASTOLIC BLOOD PRESSURE: 73 MMHG | TEMPERATURE: 97 F | OXYGEN SATURATION: 95 % | SYSTOLIC BLOOD PRESSURE: 144 MMHG | HEART RATE: 69 BPM

## 2024-11-19 LAB
ANION GAP SERPL CALC-SCNC: 10 MMOL/L — SIGNIFICANT CHANGE UP (ref 7–14)
BUN SERPL-MCNC: 24 MG/DL — HIGH (ref 10–20)
CALCIUM SERPL-MCNC: 8.6 MG/DL — SIGNIFICANT CHANGE UP (ref 8.4–10.5)
CHLORIDE SERPL-SCNC: 106 MMOL/L — SIGNIFICANT CHANGE UP (ref 98–110)
CO2 SERPL-SCNC: 23 MMOL/L — SIGNIFICANT CHANGE UP (ref 17–32)
CREAT SERPL-MCNC: 1.1 MG/DL — SIGNIFICANT CHANGE UP (ref 0.7–1.5)
EGFR: 52 ML/MIN/1.73M2 — LOW
GLUCOSE SERPL-MCNC: 104 MG/DL — HIGH (ref 70–99)
HCT VFR BLD CALC: 31.5 % — LOW (ref 37–47)
HGB BLD-MCNC: 10.5 G/DL — LOW (ref 12–16)
MCHC RBC-ENTMCNC: 30.1 PG — SIGNIFICANT CHANGE UP (ref 27–31)
MCHC RBC-ENTMCNC: 33.3 G/DL — SIGNIFICANT CHANGE UP (ref 32–37)
MCV RBC AUTO: 90.3 FL — SIGNIFICANT CHANGE UP (ref 81–99)
NRBC # BLD: 0 /100 WBCS — SIGNIFICANT CHANGE UP (ref 0–0)
PLATELET # BLD AUTO: 106 K/UL — LOW (ref 130–400)
PMV BLD: 10.2 FL — SIGNIFICANT CHANGE UP (ref 7.4–10.4)
POTASSIUM SERPL-MCNC: 4.4 MMOL/L — SIGNIFICANT CHANGE UP (ref 3.5–5)
POTASSIUM SERPL-SCNC: 4.4 MMOL/L — SIGNIFICANT CHANGE UP (ref 3.5–5)
RBC # BLD: 3.49 M/UL — LOW (ref 4.2–5.4)
RBC # FLD: 12.4 % — SIGNIFICANT CHANGE UP (ref 11.5–14.5)
SODIUM SERPL-SCNC: 139 MMOL/L — SIGNIFICANT CHANGE UP (ref 135–146)
WBC # BLD: 11.73 K/UL — HIGH (ref 4.8–10.8)
WBC # FLD AUTO: 11.73 K/UL — HIGH (ref 4.8–10.8)

## 2024-11-19 PROCEDURE — 99222 1ST HOSP IP/OBS MODERATE 55: CPT

## 2024-11-19 RX ORDER — POLYETHYLENE GLYCOL 3350 17 G/17G
17 POWDER, FOR SOLUTION ORAL
Qty: 0 | Refills: 0 | DISCHARGE
Start: 2024-11-19

## 2024-11-19 RX ORDER — IBUPROFEN 200 MG
1 TABLET ORAL
Qty: 42 | Refills: 0
Start: 2024-11-19 | End: 2024-12-02

## 2024-11-19 RX ORDER — SENNOSIDES 8.6 MG
2 TABLET ORAL
Qty: 30 | Refills: 0
Start: 2024-11-19

## 2024-11-19 RX ORDER — ACETAMINOPHEN 500MG 500 MG/1
2 TABLET, COATED ORAL
Qty: 112 | Refills: 0
Start: 2024-11-19 | End: 2024-12-02

## 2024-11-19 RX ORDER — NALOXONE HCL 0.4 MG/ML
1 AMPUL (ML) INJECTION
Qty: 1 | Refills: 0
Start: 2024-11-19

## 2024-11-19 RX ORDER — TRAMADOL HYDROCHLORIDE 300 MG/1
1 CAPSULE ORAL
Qty: 30 | Refills: 0
Start: 2024-11-19

## 2024-11-19 RX ORDER — PANTOPRAZOLE SODIUM 40 MG/1
1 TABLET, DELAYED RELEASE ORAL
Qty: 30 | Refills: 0
Start: 2024-11-19 | End: 2024-12-18

## 2024-11-19 RX ADMIN — PANTOPRAZOLE SODIUM 40 MILLIGRAM(S): 40 TABLET, DELAYED RELEASE ORAL at 05:53

## 2024-11-19 RX ADMIN — Medication 88 MICROGRAM(S): at 05:53

## 2024-11-19 RX ADMIN — ACETAMINOPHEN 500MG 650 MILLIGRAM(S): 500 TABLET, COATED ORAL at 11:30

## 2024-11-19 RX ADMIN — ACETAMINOPHEN 500MG 650 MILLIGRAM(S): 500 TABLET, COATED ORAL at 00:28

## 2024-11-19 RX ADMIN — Medication 81 MILLIGRAM(S): at 11:37

## 2024-11-19 RX ADMIN — Medication 100 MILLIGRAM(S): at 00:27

## 2024-11-19 RX ADMIN — ACETAMINOPHEN 500MG 650 MILLIGRAM(S): 500 TABLET, COATED ORAL at 07:04

## 2024-11-19 RX ADMIN — ACETAMINOPHEN 500MG 650 MILLIGRAM(S): 500 TABLET, COATED ORAL at 11:37

## 2024-11-19 RX ADMIN — ACETAMINOPHEN 500MG 650 MILLIGRAM(S): 500 TABLET, COATED ORAL at 00:57

## 2024-11-19 RX ADMIN — Medication 100 MILLIGRAM(S): at 06:16

## 2024-11-19 RX ADMIN — CHLORHEXIDINE GLUCONATE 1 APPLICATION(S): 1.2 RINSE ORAL at 05:54

## 2024-11-19 RX ADMIN — ACETAMINOPHEN 500MG 650 MILLIGRAM(S): 500 TABLET, COATED ORAL at 10:03

## 2024-11-19 RX ADMIN — DEXAMETHASONE 2 MILLIGRAM(S): 1.5 TABLET ORAL at 11:36

## 2024-11-19 RX ADMIN — TRAMADOL HYDROCHLORIDE 50 MILLIGRAM(S): 300 CAPSULE ORAL at 00:57

## 2024-11-19 RX ADMIN — TRAMADOL HYDROCHLORIDE 50 MILLIGRAM(S): 300 CAPSULE ORAL at 00:30

## 2024-11-19 RX ADMIN — ACETAMINOPHEN 500MG 650 MILLIGRAM(S): 500 TABLET, COATED ORAL at 05:53

## 2024-11-19 NOTE — PROGRESS NOTE ADULT - SUBJECTIVE AND OBJECTIVE BOX
79y Female POD #  1    S/P left Total Knee Arthroplasty     Patient seen and examined at bedside . The patient is awake and alert in NAD. No complaints of chest pain, SOB, N/V.  Pain is controlled, the patient has ambulated and is voiding.     PAST MEDICAL & SURGICAL HISTORY:  Macular degeneration    Hypothyroidism    History of ITP    OA (osteoarthritis)    H/O hearing loss    S/P     Cataract extraction status, left eye    Cataract extraction status of right eye    History of laparoscopic cholecystectomy    S/P carpal tunnel release    S/P UPPP (uvulopalatopharyngoplasty)          MEDICATIONS  (STANDING):  acetaminophen     Tablet .. 650 milliGRAM(s) Oral every 6 hours  aspirin enteric coated 81 milliGRAM(s) Oral every 12 hours  chlorhexidine 2% Cloths 1 Application(s) Topical <User Schedule>  dexAMETHasone     Tablet 2 milliGRAM(s) Oral once  levothyroxine 88 MICROGram(s) Oral daily  pantoprazole    Tablet 40 milliGRAM(s) Oral before breakfast  polyethylene glycol 3350 17 Gram(s) Oral at bedtime  senna 2 Tablet(s) Oral at bedtime  sodium chloride 0.9%. 1000 milliLiter(s) (75 mL/Hr) IV Continuous <Continuous>    MEDICATIONS  (PRN):  aluminum hydroxide/magnesium hydroxide/simethicone Suspension 30 milliLiter(s) Oral four times a day PRN Indigestion  magnesium hydroxide Suspension 30 milliLiter(s) Oral daily PRN Constipation  ondansetron Injectable 4 milliGRAM(s) IV Push every 6 hours PRN Nausea and/or Vomiting  traMADol 50 milliGRAM(s) Oral every 4 hours PRN Severe Pain (7 - 10)        Vital Signs Last 24 Hrs  T(C): 36.7 (2024 04:06), Max: 36.7 (2024 04:06)  T(F): 98 (2024 04:06), Max: 98 (2024 04:06)  HR: 63 (2024 04:06) (61 - 71)  BP: 143/64 (2024 04:06) (117/84 - 166/73)  BP(mean): --  RR: 18 (2024 04:06) (15 - 18)  SpO2: 95% (2024 04:06) (95% - 100%)                          10.5   11.73 )-----------( 106      ( 2024 07:04 )             31.5     11-    139  |  106  |  24[H]  ----------------------------<  104[H]  4.4   |  23  |  1.1    Ca    8.6      2024 07:04        Urinalysis Basic - ( 2024 07:04 )    Color: x / Appearance: x / SG: x / pH: x  Gluc: 104 mg/dL / Ketone: x  / Bili: x / Urobili: x   Blood: x / Protein: x / Nitrite: x   Leuk Esterase: x / RBC: x / WBC x   Sq Epi: x / Non Sq Epi: x / Bacteria: x            PE:  The patient was seen and examined at bedside          A&OX3, NAD          left knee with ss drainage          Compartments soft, BLE Claudio stockings and SCD in place          NVI, SILT           A/P:     # POD #    1   s/p left Total Knee Arthroplasty                 - OOB to Chair   -PT/OT - wbat  -post op abx x 2 doses  -Pain control - per pain protocol   -Incentive Spirometry   -DVT Prophylaxis - aspirin   -GI ppx- continue Protonix  -discharge planning- home with home care

## 2024-11-19 NOTE — OCCUPATIONAL THERAPY INITIAL EVALUATION ADULT - LIVES WITH, PROFILE
in a private home +4 steps to enter through side entrance with (R) handrail to level floor +tub +tub safety rail +shower chair +grab bars +standard toilet +commode/spouse

## 2024-11-19 NOTE — DISCHARGE NOTE NURSING/CASE MANAGEMENT/SOCIAL WORK - PATIENT PORTAL LINK FT
You can access the FollowMyHealth Patient Portal offered by [facility] by registering at the following website: [email or web address]. By joining [employer]’s FollowMyHealth portal, you will also be able to view your health information using other applications (apps) compatible with our system.

## 2024-11-19 NOTE — DISCHARGE NOTE PROVIDER - NSDCMRMEDTOKEN_GEN_ALL_CORE_FT
acetaminophen 325 mg oral tablet: 2 tab(s) orally every 6 hours post op pain  aspirin 81 mg oral delayed release tablet: 1 tab(s) orally every 12 hours lower the risk of DVT for 30 days after Total Knee Arthroplasty  ibuprofen 400 mg oral tablet: 1 tab(s) orally every 8 hours post op pain  naloxone 4 mg/0.1 mL nasal spray: 1 spray(s) intranasally once as needed for od take as directed in the event of accidental overdose  NPLATE INJECTIONS SUBCUTANEOUS EVERY OTHER WEEK:   pantoprazole 40 mg oral delayed release tablet: 1 tab(s) orally once a day (before a meal) GI prophylaxis for 30 days after Total Knee Arthroplasty  polyethylene glycol 3350 oral powder for reconstitution: 17 gram(s) orally once a day (at bedtime) as needed for  constipation  senna leaf extract oral tablet: 2 tab(s) orally once a day (at bedtime) while on pain medication to prevent constipation  Synthroid 88 mcg (0.088 mg) oral tablet: 1 tab(s) orally once a day  traMADol 50 mg oral tablet: 1 tab(s) orally every 4 to 6 hours as needed for Severe Pain (7 - 10) post op pain s/p Total Knee Arthroplasty MDD: 5

## 2024-11-19 NOTE — OCCUPATIONAL THERAPY INITIAL EVALUATION ADULT - TRANSFER SAFETY CONCERNS NOTED: TUB, REHAB EVAL
Pt advised to sponge bathe at this time and to practice tub transfer with home care therapist and have family member present prior to attempting independently. Pt verbalized good understanding and agreement./decreased weight-shifting ability

## 2024-11-19 NOTE — CONSULT NOTE ADULT - SUBJECTIVE AND OBJECTIVE BOX
ALF PIMENTEL  79y, Female  Allergy: latex ([place])  Shrimp ([place])  erythromycin ([place])      CHIEF COMPLAINT: Total Knee Arthroplasty (2024 10:11)      HPI:    HPI:    FAMILY HISTORY:  FH: COPD (chronic obstructive pulmonary disease) (Mother)    FH: CHF (congestive heart failure) (Mother)      PAST MEDICAL & SURGICAL HISTORY:  Macular degeneration      Hypothyroidism      History of ITP      OA (osteoarthritis)      H/O hearing loss      S/P       Cataract extraction status, left eye      Cataract extraction status of right eye      History of laparoscopic cholecystectomy      S/P carpal tunnel release      S/P UPPP (uvulopalatopharyngoplasty)          SOCIAL HISTORY  Social History:      Home Medications:  NPLATE INJECTIONS SUBCUTANEOUS EVERY OTHER WEEK:  (2024 12:54)  polyethylene glycol 3350 oral powder for reconstitution: 17 gram(s) orally once a day (at bedtime) as needed for  constipation (2024 07:43)  Synthroid 88 mcg (0.088 mg) oral tablet: 1 tab(s) orally once a day (2024 12:54)      ROS  General: Denies fevers, chills, nightsweats, weight loss  HEENT: Denies headache, rhinorrhea, sore throat, eye pain  CV: Denies CP, palpitations  PULM: Denies SOB, cough  GI: Denies abdominal pain, diarrhea  : Denies dysuria, hematuria  MSK: Denies arthralgias  SKIN: Denies rash   NEURO: Denies paresthesias, weakness  PSYCH: Denies depression    VITALS:  T(F): 98.1, Max: 98.1 (24 @ 08:13)  HR: 78  BP: 120/65  RR: 18Vital Signs Last 24 Hrs  T(C): 36.7 (2024 08:13), Max: 36.7 (2024 04:06)  T(F): 98.1 (2024 08:13), Max: 98.1 (2024 08:13)  HR: 78 (2024 08:13) (61 - 78)  BP: 120/65 (2024 08:13) (117/84 - 166/73)  BP(mean): --  RR: 18 (2024 04:06) (15 - 18)  SpO2: 94% (2024 08:13) (94% - 100%)    Parameters below as of 2024 08:13  Patient On (Oxygen Delivery Method): room air        PHYSICAL EXAM:  Gen: NAD, resting in bed  HEENT: Normocephalic, atraumatic  Neck: supple, no lymphadenopathy  CV: Regular rate & regular rhythm  Lungs: CTABL no wheeze  Abdomen: Soft, NTND+ BS present  Ext: Warm, well perfused no CCE  Neuro: non focal, awake, CN II-XII intact   Skin: no rash, no erythema  Psych: no SI, HI, Hallucination     TESTS & MEASUREMENTS:                        10.5   11.73 )-----------( 106      ( 2024 07:04 )             31.5     11-19    139  |  106  |  24[H]  ----------------------------<  104[H]  4.4   |  23  |  1.1    Ca    8.6      2024 07:04          Urinalysis Basic - ( 2024 07:04 )    Color: x / Appearance: x / SG: x / pH: x  Gluc: 104 mg/dL / Ketone: x  / Bili: x / Urobili: x   Blood: x / Protein: x / Nitrite: x   Leuk Esterase: x / RBC: x / WBC x   Sq Epi: x / Non Sq Epi: x / Bacteria: x            QRS axis to [] ° and NSR at a rate of [] BPM. There was no atrial enlargement. There was no ventricular hypertrophy. There were no ST-T changes and all intervals were normal.      INFECTIOUS DISEASES TESTING  MRSA PCR Result.: Negative (10-22-24 @ 12:44)      RADIOLOGY & ADDITIONAL TESTS:  I have personally reviewed the last Chest xray  CXR      CT      CARDIOLOGY TESTING      MEDICATIONS  (STANDING):  acetaminophen     Tablet .. 650 milliGRAM(s) Oral every 6 hours  aspirin enteric coated 81 milliGRAM(s) Oral every 12 hours  chlorhexidine 2% Cloths 1 Application(s) Topical <User Schedule>  dexAMETHasone     Tablet 2 milliGRAM(s) Oral once  levothyroxine 88 MICROGram(s) Oral daily  pantoprazole    Tablet 40 milliGRAM(s) Oral before breakfast  polyethylene glycol 3350 17 Gram(s) Oral at bedtime  senna 2 Tablet(s) Oral at bedtime  sodium chloride 0.9%. 1000 milliLiter(s) (75 mL/Hr) IV Continuous <Continuous>    MEDICATIONS  (PRN):  aluminum hydroxide/magnesium hydroxide/simethicone Suspension 30 milliLiter(s) Oral four times a day PRN Indigestion  magnesium hydroxide Suspension 30 milliLiter(s) Oral daily PRN Constipation  ondansetron Injectable 4 milliGRAM(s) IV Push every 6 hours PRN Nausea and/or Vomiting  traMADol 50 milliGRAM(s) Oral every 4 hours PRN Severe Pain (7 - 10)      ANTIBIOTICS:      All available historical data has been reviewed    ASSESSMENT  79y F admitted with Primary osteoarthritis of left knee        PROBLEMS       GERBERBRENDAALF SOSA  79y, Female  Allergy: latex ([place])  Shrimp ([place])  erythromycin ([place])      CHIEF COMPLAINT: Total Knee Arthroplasty (2024 10:11)      HPI:    HPI:    FAMILY HISTORY:  FH: COPD (chronic obstructive pulmonary disease) (Mother)    FH: CHF (congestive heart failure) (Mother)      PAST MEDICAL & SURGICAL HISTORY:  Macular degeneration      Hypothyroidism      History of ITP      OA (osteoarthritis)      H/O hearing loss      S/P       Cataract extraction status, left eye      Cataract extraction status of right eye      History of laparoscopic cholecystectomy      S/P carpal tunnel release      S/P UPPP (uvulopalatopharyngoplasty)          SOCIAL HISTORY  Social History:      Home Medications:  NPLATE INJECTIONS SUBCUTANEOUS EVERY OTHER WEEK:  (2024 12:54)  polyethylene glycol 3350 oral powder for reconstitution: 17 gram(s) orally once a day (at bedtime) as needed for  constipation (2024 07:43)  Synthroid 88 mcg (0.088 mg) oral tablet: 1 tab(s) orally once a day (2024 12:54)      ROS  General: Denies fevers, chills, nightsweats, weight loss  HEENT: Denies headache, rhinorrhea, sore throat, eye pain  CV: Denies CP, palpitations  PULM: Denies SOB, cough  GI: Denies abdominal pain, diarrhea  : Denies dysuria, hematuria      VITALS:  T(F): 98.1, Max: 98.1 (24 @ 08:13)  HR: 78  BP: 120/65  RR: 18Vital Signs Last 24 Hrs  T(C): 36.7 (2024 08:13), Max: 36.7 (2024 04:06)  T(F): 98.1 (2024 08:13), Max: 98.1 (2024 08:13)  HR: 78 (2024 08:13) (61 - 78)  BP: 120/65 (2024 08:13) (117/84 - 166/73)  BP(mean): --  RR: 18 (2024 04:06) (15 - 18)  SpO2: 94% (2024 08:13) (94% - 100%)    Parameters below as of 2024 08:13  Patient On (Oxygen Delivery Method): room air        PHYSICAL EXAM:  Gen: NAD, resting in bed  HEENT: Normocephalic, atraumatic  Neck: supple, no lymphadenopathy  CV: Regular rate & regular rhythm  Lungs: CTABL no wheeze  Abdomen: Soft, NTND+ BS present  Ext: Warm, well perfused no CCE  left knee dressing intac t    TESTS & MEASUREMENTS:                        10.5   11.73 )-----------( 106      ( 2024 07:04 )             31.5     11    139  |  106  |  24[H]  ----------------------------<  104[H]  4.4   |  23  |  1.1    Ca    8.6      2024 07:04          Urinalysis Basic - ( 2024 07:04 )    Color: x / Appearance: x / SG: x / pH: x  Gluc: 104 mg/dL / Ketone: x  / Bili: x / Urobili: x   Blood: x / Protein: x / Nitrite: x   Leuk Esterase: x / RBC: x / WBC x   Sq Epi: x / Non Sq Epi: x / Bacteria: x            QRS axis to [] ° and NSR at a rate of [] BPM. There was no atrial enlargement. There was no ventricular hypertrophy. There were no ST-T changes and all intervals were normal.      INFECTIOUS DISEASES TESTING  MRSA PCR Result.: Negative (10-22-24 @ 12:44)      RADIOLOGY & ADDITIONAL TESTS:  I have personally reviewed the last Chest xray  CXR      CT      CARDIOLOGY TESTING      MEDICATIONS  (STANDING):  acetaminophen     Tablet .. 650 milliGRAM(s) Oral every 6 hours  aspirin enteric coated 81 milliGRAM(s) Oral every 12 hours  chlorhexidine 2% Cloths 1 Application(s) Topical <User Schedule>  dexAMETHasone     Tablet 2 milliGRAM(s) Oral once  levothyroxine 88 MICROGram(s) Oral daily  pantoprazole    Tablet 40 milliGRAM(s) Oral before breakfast  polyethylene glycol 3350 17 Gram(s) Oral at bedtime  senna 2 Tablet(s) Oral at bedtime  sodium chloride 0.9%. 1000 milliLiter(s) (75 mL/Hr) IV Continuous <Continuous>    MEDICATIONS  (PRN):  aluminum hydroxide/magnesium hydroxide/simethicone Suspension 30 milliLiter(s) Oral four times a day PRN Indigestion  magnesium hydroxide Suspension 30 milliLiter(s) Oral daily PRN Constipation  ondansetron Injectable 4 milliGRAM(s) IV Push every 6 hours PRN Nausea and/or Vomiting  traMADol 50 milliGRAM(s) Oral every 4 hours PRN Severe Pain (7 - 10)      ANTIBIOTICS:      All available historical data has been reviewed    ASSESSMENT  79y F admitted with Primary osteoarthritis of left knee        PROBLEMS

## 2024-11-19 NOTE — DISCHARGE NOTE PROVIDER - NSDCCPCAREPLAN_GEN_ALL_CORE_FT
PRINCIPAL DISCHARGE DIAGNOSIS  Diagnosis: Arthritis of left knee  Assessment and Plan of Treatment: Keep surgical site clean and dry, may remove dressing in 6  days . Call your surgeon if any wound drainage, redness , increasing pain, fevers over 101 or if you have any questions or concerns.  Ice pack to affected area q4-6h as needed   You may shower with the bandage on and once it is removed. Once it is removed  , do not scrub surgical site. Do not apply any lotions/moisturizers/creams to surgical site.  Take aspirin 81 mg twice daily for 30 days to lower the risk of blood clots.  Call to make your  post op appointment if you do not have one already.

## 2024-11-19 NOTE — CONSULT NOTE ADULT - ASSESSMENT
Pre-Op Diagnosis	PRE-OP DIAGNOSIS:  Arthritis of left knee 18-Nov-2024 17:34:35  Lang Feliicano  Post-Op Diagnosis	POST-OP DIAGNOSIS:  Arthritis of left knee 18-Nov-2024 17:34:48  Lang Feliciano  Procedure	PROCEDURES:  Left total knee replacement 18-Nov-2024 17:34:22  Lang Feliciano  Operative Findings	see dictation  Evidence of infection or abscess identified at the start or during the surgical procedure:	Yes  Specimens	bone  Estimated Blood Loss	100 milliLiter(s)  Antibiotic Protocol	Followed protocol  Venous Thromboembolism Prophylaxis Therapy	asa    POD#1  pain control   DVT prophyaxsis asa q12   PT/OT  IS  Bowel Regimen  Ortho follow up discussed with ortho team     #ITP - platelets stable post op, no signs of bleeding, hgb stable\  reviewed old records - plts much lower in past <60  next Nplate injection due in 2 weeks - continue  discussed with ortho - who confirmed wiht hem onc dr monaco - ASA use ok with ITP  pt counselled extensively on  signs of bleeding, fall precuations - with  bedside     #hypothyroid - on synthroid  #macular degeneration     recall prn

## 2024-11-19 NOTE — DISCHARGE NOTE PROVIDER - NSDCFUSCHEDAPPT_GEN_ALL_CORE_FT
Basim Mcmullen  [facility]  OPHTHALM [address]  Scheduled Appointment: 12/04/2024    Mehrdad Cade  [facility] PreAdmits  Scheduled Appointment: 12/05/2024    [facility]  AMBCHEMO  [address]  Scheduled Appointment: 12/05/2024    Saeid-Christopher Hugo  [facility]  ONCORTHO [address]  Scheduled Appointment: 12/12/2024    Mehrdad Cade  [facility] PreAdmits  Scheduled Appointment: 01/09/2025    Mehrdad Cade  [facility]  HEMONC  [address]  Scheduled Appointment: 01/09/2025    [facility]  AMBCHEMO  [address]  Scheduled Appointment: 01/09/2025

## 2024-11-19 NOTE — PATIENT PROFILE ADULT - FALL HARM RISK - HARM RISK INTERVENTIONS

## 2024-11-19 NOTE — OCCUPATIONAL THERAPY INITIAL EVALUATION ADULT - PHYSICAL ASSIST/NONPHYSICAL ASSIST: CHAIR TO BED, REHAB EVAL
10/20/2020  Bertin ROGERS Schoenwalder    PROCEDURE PERFORMED:   Esophagogastroduodenoscopy with biopsies.     INDICATION FOR PROCEDURE:   Barboza's esophagus without dysplasia for surveillance     POSTPROCEDURAL DIAGNOSES:   1. Short segment Barboza's, biopsies obtained  2. Mild gastritis     MEDICATIONS:   Fentanyl 125 mcg IV and Versed 7 mg IV given in titrated and incremental fashion by the RN directly supervised by the Physician. Topical oral Lidocaine. Sedation time was 21 minutes.    DESCRIPTION OF PROCEDURE:   After risks, benefits and alternatives of the procedure were explained, informed consent was obtained. The EGD scope was introduced to the mouth and advanced to the second portion of the duodenum. The instrument was withdrawn as the mucosa was examined.     FINDINGS:   Duodenum, first and second portion were normal. Gastric antrum had mucosal erythema, edema consistent with mild gastritis. Retroflexion in the stomach did not reveal any other abnormalities. GE junction reveled irregular Z line consistent with Barboza's esophagus. Multiple biopsies were obtained for surveillance from the distal esophagus at the GE junction. The rest of the esophagus was normal. Scope was removed and procedure was terminated.     COMPLICATIONS:   None.     ENDOSCOPIC IMPRESSION:   1. Barboza's esophagus.     RECOMMENDATION:   1. Follow up biopsies.   2. Proton pump inhibitors.   3. Repeat EGD in 3 years for surveillance if no dysplasia is present.     Thank you very much for allowing me to participate in the management of this patient    
For hand placement/verbal cues/1 person assist

## 2024-11-19 NOTE — DISCHARGE NOTE NURSING/CASE MANAGEMENT/SOCIAL WORK - FINANCIAL ASSISTANCE
[facility] provides services at a reduced cost to those who are determined to be eligible through [facility]’s financial assistance program. Information regarding [facility]’s financial assistance program can be found by going to [email or web address] or by calling [phone].

## 2024-11-19 NOTE — DISCHARGE NOTE PROVIDER - CARE PROVIDER_API CALL
Christopher Chaudhry  Orthopaedic Surgery  [address] [place] [address]  Phone: [phone]  Fax: [phone]  Scheduled Appointment: 12/12/2024 10:30 AM

## 2024-11-19 NOTE — OCCUPATIONAL THERAPY INITIAL EVALUATION ADULT - IMPAIRED TRANSFERS: TOILET, REHAB EVAL
Cheek-To-Nose Interpolation Flap Text: A decision was made to reconstruct the defect utilizing an interpolation axial flap and a staged reconstruction.  A telfa template was made of the defect.  This telfa template was then used to outline the Cheek-To-Nose Interpolation flap.  The donor area for the pedicle flap was then injected with anesthesia.  The flap was excised through the skin and subcutaneous tissue down to the layer of the underlying musculature.  The interpolation flap was carefully excised within this deep plane to maintain its blood supply.  The edges of the donor site were undermined.   The donor site was closed in a primary fashion.  The pedicle was then rotated into position and sutured.  Once the tube was sutured into place, adequate blood supply was confirmed with blanching and refill.  The pedicle was then wrapped with xeroform gauze and dressed appropriately with a telfa and gauze bandage to ensure continued blood supply and protect the attached pedicle. 4/10 (L) knee/impaired balance/pain/decreased ROM/decreased strength

## 2024-11-19 NOTE — OCCUPATIONAL THERAPY INITIAL EVALUATION ADULT - ADDITIONAL COMMENTS
PLOF obtained from patient.  (+)    Pt owns SC, tub safety rail, shower chair, grab bars and commode.

## 2024-11-19 NOTE — OCCUPATIONAL THERAPY INITIAL EVALUATION ADULT - GENERAL OBSERVATIONS, REHAB EVAL
9:40-10:10 chart reviewed, ok to treat by Occupational Therapist as confirmed by HAIM Portillo, Pt received seated in bedside chair +(L) heplock +mepilex (L) knee discharge noted in NAD. Pt reports 4/10 left knee pain at this time; HAIM Portillo aware. Pt in agreement with OT IE.

## 2024-11-19 NOTE — DISCHARGE NOTE PROVIDER - HOSPITAL COURSE
79 year old female admitted for an elective Total Knee Arthroplasty. The patient tolerated surgery well with no intra/post operative complications. The patient received intra/post operative antibiotics for infection prophylaxis and will be discharged on Aspirin 81mg twice daily for 30 days to lower the risk of blood clots. The patient worked with Physical Therapy while admitted to the hospital and is stable for discharge.

## 2024-11-19 NOTE — OCCUPATIONAL THERAPY INITIAL EVALUATION ADULT - NSOTDMEREC_GEN_A_CORE
Pt owns SC, tub safety rail, shower chair, grab bars and commode./rolling walker/dressing/bathing Pt owns SC, tub safety rail, shower chair, grab bars and commode/rolling walker/dressing/bathing

## 2024-11-19 NOTE — DISCHARGE NOTE PROVIDER - NSDCHC_MEDRECSTATUS_GEN_ALL_CORE
Problem: Chronic Conditions and Co-morbidities  Goal: Patient's chronic conditions and co-morbidity symptoms are monitored and maintained or improved  Outcome: Progressing     Problem: Wound:  Goal: Will show signs of wound healing; wound closure and no evidence of infection  Description: Will show signs of wound healing; wound closure and no evidence of infection  Outcome: Progressing     Problem: Venous:  Goal: Signs of wound healing will improve  Description: Signs of wound healing will improve  Outcome: Progressing     Problem: Compression therapy:  Goal: Will be free from complications associated with compression therapy  Description: Will be free from complications associated with compression therapy  Outcome: Progressing
Admission Reconciliation is Completed  Discharge Reconciliation is Completed

## 2024-11-21 LAB — SURGICAL PATHOLOGY STUDY: SIGNIFICANT CHANGE UP

## 2024-11-21 NOTE — H&P PST ADULT - NS PRO AD NO ADVANCE DIRECTIVE
SUSAN AWAD 73y Male  MRN#: [id number]   Hospital Day: 3d    HPI:   pt is a 72 y/o male with PMH of HTN, HLD, mild SLE (Lupus) ,GIB secondary to duodenal ulcer in 1/2024,  Multiple falls , Chronic ataxia and Visual Hallucinations, hx of recently diagnosed progressive supranuclear palsy for which he follows with a neurologist at [facility],  presents to the ED due to cough and SOB for 2 days duration. per cousin belly the HCP who visited him eariler today at the Adult home, the pt has been complaining of cough and SOB for the past 2 days, he said that his chest feels congested then his cousin noticed that his breathing got worse and he started wheezing and having noisy breath sounds so asked the  nurse to get him to ED, pt at that time was denying any chest pain, he also had no fever, chills , GI or  symptoms, and no sick contacts. of note cousin mentions that he had visual hallucinations for the past week or so saying he sees a man in his closet ( this is not the first time the patient has visual hallucinations and is following with a neurologist at [facility]).     After ED eval, patient was initially admitted to TELE for mx of Aspiration PNA and NSTEMI II, then after that he started getting tachypneic, increased WOB, nurses suctioned him and then they noticed he is blue, then he became pulseless and CPR was initiated, he was successfully intubated , pt got ROSC , post ROSC rythm was Vtach per ED so he was started on amiodarone drip. during code blue ED also started him on Epinephrine drip.     Vitals on admission prior to code blue :   /74 , HR 64 , T 98.4 , O2 95%RA    CBC and CMP unremarkable   EKG NSR  Trops 32>>29   Lactate negative    CXR with b/l PNA   was given 1 L LR bolus and 1g cefepime in ED    pt is admitted to MICU (18 Nov 2024 03:54)      SUBJECTIVE      OBJECTIVE  PAST MEDICAL & SURGICAL HISTORY  Vertigo    Hypertension    Lupus erythematosus      ALLERGIES:  iodine (Rash (Mild to Mod))    MEDICATIONS:  STANDING MEDICATIONS  chlorhexidine 0.12% Liquid 15 milliLiter(s) Oral Mucosa every 12 hours  chlorhexidine 2% Cloths 1 Application(s) Topical <User Schedule>  DOPamine Infusion 2.5 MICROgram(s)/kG/Min IV Continuous <Continuous>  doxycycline IVPB 100 milliGRAM(s) IV Intermittent every 12 hours  doxycycline IVPB      enoxaparin Injectable 40 milliGRAM(s) SubCutaneous every 24 hours  folic acid 1 milliGRAM(s) Oral daily  furosemide   Injectable 40 milliGRAM(s) IV Push daily  gabapentin 100 milliGRAM(s) Oral three times a day  hydroxychloroquine 400 milliGRAM(s) Oral daily  levETIRAcetam 1500 milliGRAM(s) Oral two times a day  midazolam Infusion. 0.05 mG/kG/Hr IV Continuous <Continuous>  multivitamin 1 Tablet(s) Oral daily  norepinephrine Infusion 0.05 MICROgram(s)/kG/Min IV Continuous <Continuous>  pantoprazole   Suspension 40 milliGRAM(s) Oral two times a day  piperacillin/tazobactam IVPB.. 3.375 Gram(s) IV Intermittent every 8 hours  polyethylene glycol 3350 17 Gram(s) Oral two times a day  propofol Infusion. 40 MICROgram(s)/kG/Min IV Continuous <Continuous>  senna 2 Tablet(s) Oral at bedtime  silver sulfADIAZINE 1% Cream 1 Application(s) Topical every 12 hours  thiamine 100 milliGRAM(s) Oral daily  valproate sodium   IVPB 750 milliGRAM(s) IV Intermittent every 12 hours    PRN MEDICATIONS  acetaminophen     Tablet .. 650 milliGRAM(s) Oral every 6 hours PRN  atropine Injectable 1 milliGRAM(s) IntraMuscular once PRN      VITAL SIGNS: Last 24 Hours  T(C): 36.6 (21 Nov 2024 08:00), Max: 36.8 (21 Nov 2024 04:00)  T(F): 97.8 (21 Nov 2024 08:00), Max: 98.2 (21 Nov 2024 04:00)  HR: 39 (21 Nov 2024 09:20) (32 - 48)  BP: 112/56 (21 Nov 2024 08:00) (88/47 - 174/79)  BP(mean): 81 (21 Nov 2024 08:00) (65 - 113)  RR: 19 (21 Nov 2024 08:00) (16 - 32)  SpO2: 99% (21 Nov 2024 09:20) (97% - 100%)    LABS:                        8.5    11.15 )-----------( 209      ( 21 Nov 2024 04:25 )             26.5     11-21    137  |  104  |  22[H]  ----------------------------<  122[H]  4.1   |  25  |  0.9    Ca    7.8[L]      21 Nov 2024 04:25  Mg     2.3     11-21    TPro  5.3[L]  /  Alb  3.1[L]  /  TBili  0.2  /  DBili  x   /  AST  26  /  ALT  11  /  AlkPhos  50  11-21      Urinalysis Basic - ( 21 Nov 2024 04:25 )    Color: x / Appearance: x / SG: x / pH: x  Gluc: 122 mg/dL / Ketone: x  / Bili: x / Urobili: x   Blood: x / Protein: x / Nitrite: x   Leuk Esterase: x / RBC: x / WBC x   Sq Epi: x / Non Sq Epi: x / Bacteria: x      ABG - ( 21 Nov 2024 03:24 )  pH, Arterial: 7.43  pH, Blood: x     /  pCO2: 38    /  pO2: 114   / HCO3: 25    / Base Excess: 0.9   /  SaO2: 97.9                  Culture - Urine (collected 18 Nov 2024 14:00)  Source: Catheterized Catheterized  Final Report (20 Nov 2024 07:28):    No growth    Culture - Sputum (collected 18 Nov 2024 10:10)  Source: Trach Asp Tracheal Aspirate  Gram Stain (18 Nov 2024 22:28):    Moderate polymorphonuclear leukocytes per low power field    No Squamous epithelial cells per low power field    Moderate Gram positive cocci in pairs per oil power field    Few Gram Positive Rods per oil power field  Final Report (20 Nov 2024 17:47):    Commensal domonique consistent with body site              PHYSICAL EXAM:  GENERAL: Sedated   HEAD:  Intubated   EYES: +ve corneal reflex on neuro exam   PSYCH: Comatose   EXTREMITIES: B/L myclonic twitches intermittently   NEUROLOGY: Sedated +ve pupilary, corneal reflex, +ve tripple flex, +ve babinski, -ve gag reflex  No

## 2024-11-25 DIAGNOSIS — Z79.890 HORMONE REPLACEMENT THERAPY: ICD-10-CM

## 2024-11-25 DIAGNOSIS — H35.30 UNSPECIFIED MACULAR DEGENERATION: ICD-10-CM

## 2024-11-25 DIAGNOSIS — D69.3 IMMUNE THROMBOCYTOPENIC PURPURA: ICD-10-CM

## 2024-11-25 DIAGNOSIS — Z98.42 CATARACT EXTRACTION STATUS, LEFT EYE: ICD-10-CM

## 2024-11-25 DIAGNOSIS — E03.9 HYPOTHYROIDISM, UNSPECIFIED: ICD-10-CM

## 2024-11-25 DIAGNOSIS — Z98.41 CATARACT EXTRACTION STATUS, RIGHT EYE: ICD-10-CM

## 2024-11-25 DIAGNOSIS — Z91.040 LATEX ALLERGY STATUS: ICD-10-CM

## 2024-11-25 DIAGNOSIS — Z88.1 ALLERGY STATUS TO OTHER ANTIBIOTIC AGENTS: ICD-10-CM

## 2024-11-25 DIAGNOSIS — H91.90 UNSPECIFIED HEARING LOSS, UNSPECIFIED EAR: ICD-10-CM

## 2024-11-25 DIAGNOSIS — M17.12 UNILATERAL PRIMARY OSTEOARTHRITIS, LEFT KNEE: ICD-10-CM

## 2024-11-25 DIAGNOSIS — M25.762 OSTEOPHYTE, LEFT KNEE: ICD-10-CM

## 2024-12-04 ENCOUNTER — APPOINTMENT (OUTPATIENT)
Facility: CLINIC | Age: 79
End: 2024-12-04
Payer: MEDICARE

## 2024-12-04 ENCOUNTER — NON-APPOINTMENT (OUTPATIENT)
Age: 79
End: 2024-12-04

## 2024-12-04 PROCEDURE — 67028 INJECTION EYE DRUG: CPT | Mod: LT

## 2024-12-04 PROCEDURE — 92134 CPTRZ OPH DX IMG PST SGM RTA: CPT

## 2024-12-04 PROCEDURE — 99214 OFFICE O/P EST MOD 30 MIN: CPT | Mod: 25

## 2024-12-05 ENCOUNTER — APPOINTMENT (OUTPATIENT)
Age: 79
End: 2024-12-05

## 2024-12-05 ENCOUNTER — LABORATORY RESULT (OUTPATIENT)
Age: 79
End: 2024-12-05

## 2024-12-05 ENCOUNTER — OUTPATIENT (OUTPATIENT)
Dept: OUTPATIENT SERVICES | Facility: HOSPITAL | Age: 79
LOS: 1 days | End: 2024-12-05
Payer: MEDICARE

## 2024-12-05 VITALS — SYSTOLIC BLOOD PRESSURE: 153 MMHG | TEMPERATURE: 98 F | HEART RATE: 83 BPM | DIASTOLIC BLOOD PRESSURE: 82 MMHG

## 2024-12-05 DIAGNOSIS — Z98.890 OTHER SPECIFIED POSTPROCEDURAL STATES: Chronic | ICD-10-CM

## 2024-12-05 DIAGNOSIS — D69.6 THROMBOCYTOPENIA, UNSPECIFIED: ICD-10-CM

## 2024-12-05 DIAGNOSIS — Z98.891 HISTORY OF UTERINE SCAR FROM PREVIOUS SURGERY: Chronic | ICD-10-CM

## 2024-12-05 DIAGNOSIS — Z98.41 CATARACT EXTRACTION STATUS, RIGHT EYE: Chronic | ICD-10-CM

## 2024-12-05 DIAGNOSIS — Z98.42 CATARACT EXTRACTION STATUS, LEFT EYE: Chronic | ICD-10-CM

## 2024-12-05 LAB
HCT VFR BLD CALC: 32.8 %
HGB BLD-MCNC: 10.8 G/DL
MCHC RBC-ENTMCNC: 30.5 PG
MCHC RBC-ENTMCNC: 32.9 G/DL
MCV RBC AUTO: 92.7 FL
PLATELET # BLD AUTO: 106 K/UL
PMV BLD: 10.8 FL
RBC # BLD: 3.54 M/UL
RBC # FLD: 13.5 %
WBC # FLD AUTO: 5.8 K/UL

## 2024-12-05 PROCEDURE — 85027 COMPLETE CBC AUTOMATED: CPT

## 2024-12-05 PROCEDURE — 96372 THER/PROPH/DIAG INJ SC/IM: CPT

## 2024-12-05 RX ORDER — ROMIPLOSTIM 125 UG/.25ML
140 INJECTION, POWDER, LYOPHILIZED, FOR SOLUTION SUBCUTANEOUS ONCE
Refills: 0 | Status: COMPLETED | OUTPATIENT
Start: 2024-12-05 | End: 2024-12-05

## 2024-12-05 RX ADMIN — ROMIPLOSTIM 140 MICROGRAM(S): 125 INJECTION, POWDER, LYOPHILIZED, FOR SOLUTION SUBCUTANEOUS at 11:14

## 2024-12-06 DIAGNOSIS — D69.6 THROMBOCYTOPENIA, UNSPECIFIED: ICD-10-CM

## 2024-12-11 ENCOUNTER — OUTPATIENT (OUTPATIENT)
Dept: OUTPATIENT SERVICES | Facility: HOSPITAL | Age: 79
LOS: 1 days | End: 2024-12-11
Payer: MEDICARE

## 2024-12-11 ENCOUNTER — LABORATORY RESULT (OUTPATIENT)
Age: 79
End: 2024-12-11

## 2024-12-11 ENCOUNTER — APPOINTMENT (OUTPATIENT)
Age: 79
End: 2024-12-11

## 2024-12-11 DIAGNOSIS — Z98.890 OTHER SPECIFIED POSTPROCEDURAL STATES: Chronic | ICD-10-CM

## 2024-12-11 DIAGNOSIS — Z98.891 HISTORY OF UTERINE SCAR FROM PREVIOUS SURGERY: Chronic | ICD-10-CM

## 2024-12-11 DIAGNOSIS — Z98.41 CATARACT EXTRACTION STATUS, RIGHT EYE: Chronic | ICD-10-CM

## 2024-12-11 DIAGNOSIS — D69.6 THROMBOCYTOPENIA, UNSPECIFIED: ICD-10-CM

## 2024-12-11 DIAGNOSIS — Z98.42 CATARACT EXTRACTION STATUS, LEFT EYE: Chronic | ICD-10-CM

## 2024-12-11 PROCEDURE — 96372 THER/PROPH/DIAG INJ SC/IM: CPT

## 2024-12-11 PROCEDURE — 85027 COMPLETE CBC AUTOMATED: CPT

## 2024-12-11 RX ORDER — ROMIPLOSTIM 125 UG/.25ML
140 INJECTION, POWDER, LYOPHILIZED, FOR SOLUTION SUBCUTANEOUS ONCE
Refills: 0 | Status: COMPLETED | OUTPATIENT
Start: 2024-12-11 | End: 2024-12-11

## 2024-12-11 RX ADMIN — ROMIPLOSTIM 140 MICROGRAM(S): 125 INJECTION, POWDER, LYOPHILIZED, FOR SOLUTION SUBCUTANEOUS at 09:30

## 2024-12-12 ENCOUNTER — APPOINTMENT (OUTPATIENT)
Facility: CLINIC | Age: 79
End: 2024-12-12

## 2024-12-12 DIAGNOSIS — M17.11 UNILATERAL PRIMARY OSTEOARTHRITIS, RIGHT KNEE: ICD-10-CM

## 2024-12-12 DIAGNOSIS — Z96.652 PRESENCE OF LEFT ARTIFICIAL KNEE JOINT: ICD-10-CM

## 2024-12-12 LAB
HCT VFR BLD CALC: 32.7 %
HGB BLD-MCNC: 11.2 G/DL
MCHC RBC-ENTMCNC: 31.2 PG
MCHC RBC-ENTMCNC: 34.3 G/DL
MCV RBC AUTO: 91.1 FL
PLATELET # BLD AUTO: 124 K/UL
PMV BLD: 11.1 FL
RBC # BLD: 3.59 M/UL
RBC # FLD: 13.4 %
WBC # FLD AUTO: 5.19 K/UL

## 2024-12-12 PROCEDURE — 99024 POSTOP FOLLOW-UP VISIT: CPT

## 2024-12-12 PROCEDURE — 73560 X-RAY EXAM OF KNEE 1 OR 2: CPT | Mod: 50

## 2024-12-12 PROCEDURE — 20610 DRAIN/INJ JOINT/BURSA W/O US: CPT | Mod: 79,RT

## 2024-12-20 ENCOUNTER — OUTPATIENT (OUTPATIENT)
Dept: OUTPATIENT SERVICES | Facility: HOSPITAL | Age: 79
LOS: 1 days | End: 2024-12-20
Payer: MEDICARE

## 2024-12-20 ENCOUNTER — APPOINTMENT (OUTPATIENT)
Age: 79
End: 2024-12-20

## 2024-12-20 ENCOUNTER — LABORATORY RESULT (OUTPATIENT)
Age: 79
End: 2024-12-20

## 2024-12-20 DIAGNOSIS — Z98.890 OTHER SPECIFIED POSTPROCEDURAL STATES: Chronic | ICD-10-CM

## 2024-12-20 DIAGNOSIS — D69.6 THROMBOCYTOPENIA, UNSPECIFIED: ICD-10-CM

## 2024-12-20 DIAGNOSIS — Z98.41 CATARACT EXTRACTION STATUS, RIGHT EYE: Chronic | ICD-10-CM

## 2024-12-20 DIAGNOSIS — Z98.891 HISTORY OF UTERINE SCAR FROM PREVIOUS SURGERY: Chronic | ICD-10-CM

## 2024-12-20 DIAGNOSIS — Z98.42 CATARACT EXTRACTION STATUS, LEFT EYE: Chronic | ICD-10-CM

## 2024-12-20 LAB
HCT VFR BLD CALC: 33.7 %
HGB BLD-MCNC: 11.2 G/DL
MCHC RBC-ENTMCNC: 30.7 PG
MCHC RBC-ENTMCNC: 33.2 G/DL
MCV RBC AUTO: 92.3 FL
PLATELET # BLD AUTO: 264 K/UL
PMV BLD: 9 FL
RBC # BLD: 3.65 M/UL
RBC # FLD: 13.2 %
WBC # FLD AUTO: 7.83 K/UL

## 2024-12-20 PROCEDURE — 96372 THER/PROPH/DIAG INJ SC/IM: CPT

## 2024-12-20 PROCEDURE — 85027 COMPLETE CBC AUTOMATED: CPT

## 2024-12-20 RX ORDER — ROMIPLOSTIM 125 UG/.25ML
140 INJECTION, POWDER, LYOPHILIZED, FOR SOLUTION SUBCUTANEOUS ONCE
Refills: 0 | Status: COMPLETED | OUTPATIENT
Start: 2024-12-20 | End: 2024-12-20

## 2024-12-20 RX ADMIN — ROMIPLOSTIM 140 MICROGRAM(S): 125 INJECTION, POWDER, LYOPHILIZED, FOR SOLUTION SUBCUTANEOUS at 13:17

## 2024-12-27 ENCOUNTER — APPOINTMENT (OUTPATIENT)
Age: 79
End: 2024-12-27

## 2025-01-03 ENCOUNTER — APPOINTMENT (OUTPATIENT)
Age: 80
End: 2025-01-03

## 2025-01-03 ENCOUNTER — OUTPATIENT (OUTPATIENT)
Dept: OUTPATIENT SERVICES | Facility: HOSPITAL | Age: 80
LOS: 1 days | End: 2025-01-03
Payer: MEDICARE

## 2025-01-03 ENCOUNTER — LABORATORY RESULT (OUTPATIENT)
Age: 80
End: 2025-01-03

## 2025-01-03 VITALS — SYSTOLIC BLOOD PRESSURE: 138 MMHG | HEART RATE: 76 BPM | DIASTOLIC BLOOD PRESSURE: 80 MMHG | TEMPERATURE: 96 F

## 2025-01-03 DIAGNOSIS — Z98.890 OTHER SPECIFIED POSTPROCEDURAL STATES: Chronic | ICD-10-CM

## 2025-01-03 DIAGNOSIS — Z98.891 HISTORY OF UTERINE SCAR FROM PREVIOUS SURGERY: Chronic | ICD-10-CM

## 2025-01-03 DIAGNOSIS — Z98.42 CATARACT EXTRACTION STATUS, LEFT EYE: Chronic | ICD-10-CM

## 2025-01-03 DIAGNOSIS — Z98.41 CATARACT EXTRACTION STATUS, RIGHT EYE: Chronic | ICD-10-CM

## 2025-01-03 DIAGNOSIS — D69.6 THROMBOCYTOPENIA, UNSPECIFIED: ICD-10-CM

## 2025-01-03 LAB
HCT VFR BLD CALC: 34.3 %
HGB BLD-MCNC: 11.7 G/DL
MCHC RBC-ENTMCNC: 30.4 PG
MCHC RBC-ENTMCNC: 34.1 G/DL
MCV RBC AUTO: 89.1 FL
PLATELET # BLD AUTO: 69 K/UL
PMV BLD: 10.1 FL
RBC # BLD: 3.85 M/UL
RBC # FLD: 13 %
WBC # FLD AUTO: 5.74 K/UL

## 2025-01-03 PROCEDURE — 85027 COMPLETE CBC AUTOMATED: CPT

## 2025-01-03 PROCEDURE — 96372 THER/PROPH/DIAG INJ SC/IM: CPT

## 2025-01-03 RX ORDER — ROMIPLOSTIM 125 UG/.25ML
140 INJECTION, POWDER, LYOPHILIZED, FOR SOLUTION SUBCUTANEOUS ONCE
Refills: 0 | Status: COMPLETED | OUTPATIENT
Start: 2025-01-03 | End: 2025-01-03

## 2025-01-03 RX ADMIN — ROMIPLOSTIM 140 MICROGRAM(S): 125 INJECTION, POWDER, LYOPHILIZED, FOR SOLUTION SUBCUTANEOUS at 15:43

## 2025-01-04 DIAGNOSIS — D69.6 THROMBOCYTOPENIA, UNSPECIFIED: ICD-10-CM

## 2025-01-09 ENCOUNTER — APPOINTMENT (OUTPATIENT)
Age: 80
End: 2025-01-09
Payer: MEDICARE

## 2025-01-09 ENCOUNTER — LABORATORY RESULT (OUTPATIENT)
Age: 80
End: 2025-01-09

## 2025-01-09 ENCOUNTER — OUTPATIENT (OUTPATIENT)
Dept: OUTPATIENT SERVICES | Facility: HOSPITAL | Age: 80
LOS: 1 days | End: 2025-01-09
Payer: MEDICARE

## 2025-01-09 VITALS
BODY MASS INDEX: 27.49 KG/M2 | SYSTOLIC BLOOD PRESSURE: 148 MMHG | RESPIRATION RATE: 16 BRPM | TEMPERATURE: 98.1 F | WEIGHT: 161 LBS | HEART RATE: 84 BPM | OXYGEN SATURATION: 98 % | DIASTOLIC BLOOD PRESSURE: 75 MMHG | HEIGHT: 64 IN

## 2025-01-09 DIAGNOSIS — D69.6 THROMBOCYTOPENIA, UNSPECIFIED: ICD-10-CM

## 2025-01-09 DIAGNOSIS — Z98.41 CATARACT EXTRACTION STATUS, RIGHT EYE: Chronic | ICD-10-CM

## 2025-01-09 DIAGNOSIS — Z98.42 CATARACT EXTRACTION STATUS, LEFT EYE: Chronic | ICD-10-CM

## 2025-01-09 DIAGNOSIS — Z98.890 OTHER SPECIFIED POSTPROCEDURAL STATES: Chronic | ICD-10-CM

## 2025-01-09 DIAGNOSIS — Z98.891 HISTORY OF UTERINE SCAR FROM PREVIOUS SURGERY: Chronic | ICD-10-CM

## 2025-01-09 LAB
HCT VFR BLD CALC: 36.4 %
HGB BLD-MCNC: 12.4 G/DL
MCHC RBC-ENTMCNC: 30.7 PG
MCHC RBC-ENTMCNC: 34.1 G/DL
MCV RBC AUTO: 90.1 FL
PLATELET # BLD AUTO: 140 K/UL
PMV BLD: 10.5 FL
RBC # BLD: 4.04 M/UL
RBC # FLD: 12.9 %
WBC # FLD AUTO: 8.99 K/UL

## 2025-01-09 PROCEDURE — 99213 OFFICE O/P EST LOW 20 MIN: CPT

## 2025-01-09 PROCEDURE — 85027 COMPLETE CBC AUTOMATED: CPT

## 2025-02-12 DIAGNOSIS — E03.9 HYPOTHYROIDISM, UNSPECIFIED: ICD-10-CM

## 2025-02-12 RX ORDER — LEVOTHYROXINE SODIUM 88 UG/1
88 TABLET ORAL
Qty: 90 | Refills: 2 | Status: ACTIVE | COMMUNITY
Start: 2025-02-12 | End: 1900-01-01

## 2025-06-19 ENCOUNTER — APPOINTMENT (OUTPATIENT)
Facility: CLINIC | Age: 80
End: 2025-06-19
Payer: MEDICARE

## 2025-06-19 ENCOUNTER — OUTPATIENT (OUTPATIENT)
Dept: OUTPATIENT SERVICES | Facility: HOSPITAL | Age: 80
LOS: 1 days | End: 2025-06-19
Payer: MEDICARE

## 2025-06-19 ENCOUNTER — APPOINTMENT (OUTPATIENT)
Age: 80
End: 2025-06-19
Payer: MEDICARE

## 2025-06-19 VITALS
WEIGHT: 163 LBS | SYSTOLIC BLOOD PRESSURE: 146 MMHG | BODY MASS INDEX: 27.83 KG/M2 | OXYGEN SATURATION: 99 % | TEMPERATURE: 98.6 F | HEART RATE: 89 BPM | HEIGHT: 64 IN | RESPIRATION RATE: 16 BRPM | DIASTOLIC BLOOD PRESSURE: 77 MMHG

## 2025-06-19 DIAGNOSIS — Z98.42 CATARACT EXTRACTION STATUS, LEFT EYE: Chronic | ICD-10-CM

## 2025-06-19 DIAGNOSIS — D69.6 THROMBOCYTOPENIA, UNSPECIFIED: ICD-10-CM

## 2025-06-19 DIAGNOSIS — Z98.41 CATARACT EXTRACTION STATUS, RIGHT EYE: Chronic | ICD-10-CM

## 2025-06-19 DIAGNOSIS — Z98.890 OTHER SPECIFIED POSTPROCEDURAL STATES: Chronic | ICD-10-CM

## 2025-06-19 DIAGNOSIS — Z98.891 HISTORY OF UTERINE SCAR FROM PREVIOUS SURGERY: Chronic | ICD-10-CM

## 2025-06-19 LAB
AUTO BASOPHILS #: 0.04 K/UL
AUTO BASOPHILS %: 0.5 %
AUTO EOSINOPHILS #: 0.01 K/UL
AUTO EOSINOPHILS %: 0.1 %
AUTO IMMATURE GRANULOCYTES #: 0.03 K/UL
AUTO LYMPHOCYTES #: 0.41 K/UL
AUTO LYMPHOCYTES %: 4.7 %
AUTO MONOCYTES #: 0.05 K/UL
AUTO MONOCYTES %: 0.6 %
AUTO NEUTROPHILS #: 8.14 K/UL
AUTO NEUTROPHILS %: 93.8 %
AUTO NRBC #: 0 K/UL
HCT VFR BLD CALC: 36.7 %
HGB BLD-MCNC: 12.5 G/DL
IMM GRANULOCYTES NFR BLD AUTO: 0.3 %
MAN DIFF?: NORMAL
MCHC RBC-ENTMCNC: 30.5 PG
MCHC RBC-ENTMCNC: 34.1 G/DL
MCV RBC AUTO: 89.5 FL
PLATELET # BLD AUTO: 153 K/UL
PMV BLD AUTO: 0 /100 WBCS
PMV BLD: 9.3 FL
RBC # BLD: 4.1 M/UL
RBC # FLD: 12.5 %
WBC # FLD AUTO: 8.68 K/UL

## 2025-06-19 PROCEDURE — 99213 OFFICE O/P EST LOW 20 MIN: CPT

## 2025-06-19 PROCEDURE — 20610 DRAIN/INJ JOINT/BURSA W/O US: CPT | Mod: RT

## 2025-06-19 PROCEDURE — 85025 COMPLETE CBC W/AUTO DIFF WBC: CPT

## 2025-06-19 PROCEDURE — 99213 OFFICE O/P EST LOW 20 MIN: CPT | Mod: 25

## 2025-06-19 PROCEDURE — 80053 COMPREHEN METABOLIC PANEL: CPT

## 2025-06-20 DIAGNOSIS — D69.6 THROMBOCYTOPENIA, UNSPECIFIED: ICD-10-CM

## 2025-06-20 LAB
ALBUMIN SERPL ELPH-MCNC: 4.6 G/DL
ALP BLD-CCNC: 100 U/L
ALT SERPL-CCNC: 14 U/L
ANION GAP SERPL CALC-SCNC: 14 MMOL/L
AST SERPL-CCNC: 21 U/L
BILIRUB SERPL-MCNC: 0.9 MG/DL
BUN SERPL-MCNC: 31 MG/DL
CALCIUM SERPL-MCNC: 9.1 MG/DL
CHLORIDE SERPL-SCNC: 107 MMOL/L
CO2 SERPL-SCNC: 19 MMOL/L
CREAT SERPL-MCNC: 1 MG/DL
EGFRCR SERPLBLD CKD-EPI 2021: 58 ML/MIN/1.73M2
GLUCOSE SERPL-MCNC: 132 MG/DL
POTASSIUM SERPL-SCNC: 4.3 MMOL/L
PROT SERPL-MCNC: 6.6 G/DL
SODIUM SERPL-SCNC: 140 MMOL/L

## 2025-06-24 ENCOUNTER — APPOINTMENT (OUTPATIENT)
Age: 80
End: 2025-06-24

## 2025-07-08 ENCOUNTER — APPOINTMENT (OUTPATIENT)
Age: 80
End: 2025-07-08
Payer: MEDICARE

## 2025-07-08 VITALS
WEIGHT: 162 LBS | TEMPERATURE: 97.7 F | OXYGEN SATURATION: 97 % | HEIGHT: 64 IN | BODY MASS INDEX: 27.66 KG/M2 | DIASTOLIC BLOOD PRESSURE: 78 MMHG | SYSTOLIC BLOOD PRESSURE: 128 MMHG | RESPIRATION RATE: 16 BRPM | HEART RATE: 78 BPM

## 2025-07-08 LAB
ALBUMIN SERPL ELPH-MCNC: 4.1 G/DL
ALP BLD-CCNC: 86 U/L
ALT SERPL-CCNC: 15 U/L
ANION GAP SERPL CALC-SCNC: 13 MMOL/L
AST SERPL-CCNC: 20 U/L
AUTO BASOPHILS #: 0.09 K/UL
AUTO BASOPHILS %: 1.8 %
AUTO EOSINOPHILS #: 0.28 K/UL
AUTO EOSINOPHILS %: 5.6 %
AUTO IMMATURE GRANULOCYTES #: 0.01 K/UL
AUTO LYMPHOCYTES #: 1.38 K/UL
AUTO LYMPHOCYTES %: 27.6 %
AUTO MONOCYTES #: 0.44 K/UL
AUTO MONOCYTES %: 8.8 %
AUTO NEUTROPHILS #: 2.8 K/UL
AUTO NEUTROPHILS %: 56 %
AUTO NRBC #: 0 K/UL
BILIRUB SERPL-MCNC: 0.9 MG/DL
BUN SERPL-MCNC: 22 MG/DL
CALCIUM SERPL-MCNC: 9.2 MG/DL
CHLORIDE SERPL-SCNC: 108 MMOL/L
CO2 SERPL-SCNC: 18 MMOL/L
CREAT SERPL-MCNC: 1.1 MG/DL
EGFRCR SERPLBLD CKD-EPI 2021: 52 ML/MIN/1.73M2
GLUCOSE SERPL-MCNC: 116 MG/DL
HCT VFR BLD CALC: 35.8 %
HGB BLD-MCNC: 12.4 G/DL
IMM GRANULOCYTES NFR BLD AUTO: 0.2 %
IMMATURE PLATELET FRACTION #: 2.4 K/UL
IMMATURE PLATELET FRACTION %: 4.9 %
MAN DIFF?: NORMAL
MCHC RBC-ENTMCNC: 31.2 PG
MCHC RBC-ENTMCNC: 34.6 G/DL
MCV RBC AUTO: 90.2 FL
PLATELET # BLD AUTO: 48 K/UL
PMV BLD AUTO: 0 /100 WBCS
PMV BLD: 10.8 FL
POTASSIUM SERPL-SCNC: 4.2 MMOL/L
PROT SERPL-MCNC: 5.8 G/DL
RBC # BLD: 3.97 M/UL
RBC # FLD: 12.7 %
SODIUM SERPL-SCNC: 139 MMOL/L
WBC # FLD AUTO: 5 K/UL

## 2025-07-08 PROCEDURE — 99213 OFFICE O/P EST LOW 20 MIN: CPT

## 2025-07-15 ENCOUNTER — APPOINTMENT (OUTPATIENT)
Age: 80
End: 2025-07-15

## 2025-07-15 LAB
AUTO BASOPHILS #: 0.08 K/UL
AUTO BASOPHILS %: 1.2 %
AUTO EOSINOPHILS #: 0.23 K/UL
AUTO EOSINOPHILS %: 3.4 %
AUTO IMMATURE GRANULOCYTES #: 0.03 K/UL
AUTO LYMPHOCYTES #: 1.15 K/UL
AUTO LYMPHOCYTES %: 17.2 %
AUTO MONOCYTES #: 0.48 K/UL
AUTO MONOCYTES %: 7.2 %
AUTO NEUTROPHILS #: 4.73 K/UL
AUTO NEUTROPHILS %: 70.6 %
AUTO NRBC #: 0 K/UL
HCT VFR BLD CALC: 36 %
HGB BLD-MCNC: 12.4 G/DL
IMM GRANULOCYTES NFR BLD AUTO: 0.4 %
MAN DIFF?: NORMAL
MCHC RBC-ENTMCNC: 30.7 PG
MCHC RBC-ENTMCNC: 34.4 G/DL
MCV RBC AUTO: 89.1 FL
PLATELET # BLD AUTO: 177 K/UL
PMV BLD AUTO: 0 /100 WBCS
PMV BLD: 9.9 FL
RBC # BLD: 4.04 M/UL
RBC # FLD: 13.1 %
WBC # FLD AUTO: 6.7 K/UL

## 2025-07-22 ENCOUNTER — APPOINTMENT (OUTPATIENT)
Age: 80
End: 2025-07-22

## 2025-07-22 LAB
AUTO BASOPHILS #: 0.11 K/UL
AUTO BASOPHILS %: 1.6 %
AUTO EOSINOPHILS #: 0.15 K/UL
AUTO EOSINOPHILS %: 2.2 %
AUTO IMMATURE GRANULOCYTES #: 0.03 K/UL
AUTO LYMPHOCYTES #: 1.68 K/UL
AUTO LYMPHOCYTES %: 25 %
AUTO MONOCYTES #: 0.45 K/UL
AUTO MONOCYTES %: 6.7 %
AUTO NEUTROPHILS #: 4.3 K/UL
AUTO NEUTROPHILS %: 64.1 %
AUTO NRBC #: 0 K/UL
HCT VFR BLD CALC: 38 %
HGB BLD-MCNC: 12.9 G/DL
IMM GRANULOCYTES NFR BLD AUTO: 0.4 %
IMMATURE PLATELET FRACTION #: 2.7 K/UL
IMMATURE PLATELET FRACTION %: 3.3 %
MAN DIFF?: NORMAL
MCHC RBC-ENTMCNC: 30.6 PG
MCHC RBC-ENTMCNC: 33.9 G/DL
MCV RBC AUTO: 90.3 FL
PLATELET # BLD AUTO: 82 K/UL
PMV BLD AUTO: 0 /100 WBCS
PMV BLD: 10.1 FL
RBC # BLD: 4.21 M/UL
RBC # FLD: 12.8 %
WBC # FLD AUTO: 6.72 K/UL

## 2025-07-29 ENCOUNTER — APPOINTMENT (OUTPATIENT)
Age: 80
End: 2025-07-29

## 2025-08-05 ENCOUNTER — APPOINTMENT (OUTPATIENT)
Age: 80
End: 2025-08-05
Payer: MEDICARE

## 2025-08-05 VITALS
HEIGHT: 64 IN | SYSTOLIC BLOOD PRESSURE: 153 MMHG | TEMPERATURE: 97.7 F | OXYGEN SATURATION: 98 % | WEIGHT: 161 LBS | DIASTOLIC BLOOD PRESSURE: 83 MMHG | RESPIRATION RATE: 16 BRPM | BODY MASS INDEX: 27.49 KG/M2 | HEART RATE: 79 BPM

## 2025-08-05 DIAGNOSIS — D69.6 THROMBOCYTOPENIA, UNSPECIFIED: ICD-10-CM

## 2025-08-05 LAB
AUTO BASOPHILS #: 0.12 K/UL
AUTO BASOPHILS %: 2.2 %
AUTO EOSINOPHILS #: 0.35 K/UL
AUTO EOSINOPHILS %: 6.4 %
AUTO IMMATURE GRANULOCYTES #: 0.04 K/UL
AUTO LYMPHOCYTES #: 1.71 K/UL
AUTO LYMPHOCYTES %: 31.1 %
AUTO MONOCYTES #: 0.4 K/UL
AUTO MONOCYTES %: 7.3 %
AUTO NEUTROPHILS #: 2.87 K/UL
AUTO NEUTROPHILS %: 52.3 %
AUTO NRBC #: 0 K/UL
HCT VFR BLD CALC: 35.8 %
HGB BLD-MCNC: 12.6 G/DL
IMM GRANULOCYTES NFR BLD AUTO: 0.7 %
IMMATURE PLATELET FRACTION #: 3.2 K/UL
IMMATURE PLATELET FRACTION %: 4 %
MAN DIFF?: NORMAL
MCHC RBC-ENTMCNC: 31.2 PG
MCHC RBC-ENTMCNC: 35.2 G/DL
MCV RBC AUTO: 88.6 FL
PLATELET # BLD AUTO: 79 K/UL
PMV BLD AUTO: 0 /100 WBCS
PMV BLD: 9.9 FL
RBC # BLD: 4.04 M/UL
RBC # FLD: 12.7 %
WBC # FLD AUTO: 5.49 K/UL

## 2025-08-05 PROCEDURE — 99213 OFFICE O/P EST LOW 20 MIN: CPT

## 2025-08-05 RX ORDER — ELTROMBOPAG OLAMINE 25 MG/1
25 POWDER, FOR SUSPENSION ORAL DAILY
Qty: 30 | Refills: 1 | Status: ACTIVE | COMMUNITY
Start: 2025-08-05 | End: 1900-01-01

## 2025-08-06 RX ORDER — VIT C/E/ZN/COPPR/LUTEIN/ZEAXAN 250MG-90MG
CAPSULE ORAL
Refills: 0 | Status: ACTIVE | COMMUNITY
Start: 2025-08-06

## 2025-08-06 RX ORDER — ELECTROLYTES/DEXTROSE
SOLUTION, ORAL ORAL DAILY
Refills: 0 | Status: ACTIVE | COMMUNITY
Start: 2025-08-06

## 2025-08-19 ENCOUNTER — APPOINTMENT (OUTPATIENT)
Age: 80
End: 2025-08-19

## 2025-08-19 LAB
AUTO BASOPHILS #: 0.09 K/UL
AUTO BASOPHILS %: 1.3 %
AUTO EOSINOPHILS #: 0.36 K/UL
AUTO EOSINOPHILS %: 5 %
AUTO IMMATURE GRANULOCYTES #: 0.03 K/UL
AUTO LYMPHOCYTES #: 1.62 K/UL
AUTO LYMPHOCYTES %: 22.5 %
AUTO MONOCYTES #: 0.47 K/UL
AUTO MONOCYTES %: 6.5 %
AUTO NEUTROPHILS #: 4.63 K/UL
AUTO NEUTROPHILS %: 64.3 %
AUTO NRBC #: 0 K/UL
HCT VFR BLD CALC: 36.2 %
HGB BLD-MCNC: 12.5 G/DL
IMM GRANULOCYTES NFR BLD AUTO: 0.4 %
IMMATURE PLATELET FRACTION #: 2.7 K/UL
IMMATURE PLATELET FRACTION %: 3.7 %
MAN DIFF?: NORMAL
MCHC RBC-ENTMCNC: 30.8 PG
MCHC RBC-ENTMCNC: 34.5 G/DL
MCV RBC AUTO: 89.2 FL
PLATELET # BLD AUTO: 73 K/UL
PMV BLD AUTO: 0 /100 WBCS
PMV BLD: 10.3 FL
RBC # BLD: 4.06 M/UL
RBC # FLD: 12.5 %
WBC # FLD AUTO: 7.2 K/UL

## 2025-08-20 ENCOUNTER — APPOINTMENT (OUTPATIENT)
Facility: CLINIC | Age: 80
End: 2025-08-20
Payer: MEDICARE

## 2025-08-20 ENCOUNTER — NON-APPOINTMENT (OUTPATIENT)
Age: 80
End: 2025-08-20

## 2025-08-20 PROCEDURE — 92014 COMPRE OPH EXAM EST PT 1/>: CPT | Mod: 25

## 2025-08-20 PROCEDURE — 67028 INJECTION EYE DRUG: CPT | Mod: LT

## 2025-08-20 PROCEDURE — 92134 CPTRZ OPH DX IMG PST SGM RTA: CPT

## 2025-08-27 ENCOUNTER — APPOINTMENT (OUTPATIENT)
Age: 80
End: 2025-08-27

## 2025-08-28 LAB
AUTO BASOPHILS #: 0.08 K/UL
AUTO BASOPHILS %: 1.3 %
AUTO EOSINOPHILS #: 0.29 K/UL
AUTO EOSINOPHILS %: 4.7 %
AUTO IMMATURE GRANULOCYTES #: 0.02 K/UL
AUTO LYMPHOCYTES #: 1.32 K/UL
AUTO LYMPHOCYTES %: 21.4 %
AUTO MONOCYTES #: 0.49 K/UL
AUTO MONOCYTES %: 7.9 %
AUTO NEUTROPHILS #: 3.97 K/UL
AUTO NEUTROPHILS %: 64.4 %
AUTO NRBC #: 0 K/UL
HCT VFR BLD CALC: 35.1 %
HGB BLD-MCNC: 12.3 G/DL
IMM GRANULOCYTES NFR BLD AUTO: 0.3 %
IMMATURE PLATELET FRACTION #: 4.5 K/UL
IMMATURE PLATELET FRACTION %: 5.2 %
MAN DIFF?: NORMAL
MCHC RBC-ENTMCNC: 31 PG
MCHC RBC-ENTMCNC: 35 G/DL
MCV RBC AUTO: 88.4 FL
PLATELET # BLD AUTO: 87 K/UL
PMV BLD AUTO: 0 /100 WBCS
PMV BLD: 10.8 FL
RBC # BLD: 3.97 M/UL
RBC # FLD: 12.6 %
WBC # FLD AUTO: 6.17 K/UL

## 2025-09-02 ENCOUNTER — APPOINTMENT (OUTPATIENT)
Age: 80
End: 2025-09-02

## 2025-09-02 LAB
AUTO BASOPHILS #: 0.07 K/UL
AUTO BASOPHILS %: 0.9 %
AUTO EOSINOPHILS #: 0.39 K/UL
AUTO EOSINOPHILS %: 5 %
AUTO IMMATURE GRANULOCYTES #: 0.03 K/UL
AUTO LYMPHOCYTES #: 1.64 K/UL
AUTO LYMPHOCYTES %: 20.9 %
AUTO MONOCYTES #: 0.54 K/UL
AUTO MONOCYTES %: 6.9 %
AUTO NEUTROPHILS #: 5.16 K/UL
AUTO NEUTROPHILS %: 65.9 %
AUTO NRBC #: 0 K/UL
HCT VFR BLD CALC: 35.1 %
HGB BLD-MCNC: 12.4 G/DL
IMM GRANULOCYTES NFR BLD AUTO: 0.4 %
MAN DIFF?: NORMAL
MCHC RBC-ENTMCNC: 30.7 PG
MCHC RBC-ENTMCNC: 35.3 G/DL
MCV RBC AUTO: 86.9 FL
PLATELET # BLD AUTO: 145 K/UL
PMV BLD AUTO: 0 /100 WBCS
PMV BLD: 10.1 FL
RBC # BLD: 4.04 M/UL
RBC # FLD: 12.7 %
WBC # FLD AUTO: 7.83 K/UL

## 2025-09-03 LAB
ALBUMIN SERPL ELPH-MCNC: 4.4 G/DL
ALP BLD-CCNC: 102 U/L
ALT SERPL-CCNC: 14 U/L
ANION GAP SERPL CALC-SCNC: 13 MMOL/L
AST SERPL-CCNC: 22 U/L
BILIRUB SERPL-MCNC: 0.6 MG/DL
BUN SERPL-MCNC: 21 MG/DL
CALCIUM SERPL-MCNC: 9 MG/DL
CHLORIDE SERPL-SCNC: 107 MMOL/L
CO2 SERPL-SCNC: 18 MMOL/L
CREAT SERPL-MCNC: 1.1 MG/DL
EGFRCR SERPLBLD CKD-EPI 2021: 52 ML/MIN/1.73M2
GLUCOSE SERPL-MCNC: 123 MG/DL
POTASSIUM SERPL-SCNC: 4.5 MMOL/L
PROT SERPL-MCNC: 6.1 G/DL
SODIUM SERPL-SCNC: 138 MMOL/L

## 2025-09-09 ENCOUNTER — APPOINTMENT (OUTPATIENT)
Age: 80
End: 2025-09-09

## 2025-09-09 LAB
AUTO BASOPHILS #: 0.09 K/UL
AUTO BASOPHILS %: 1.7 %
AUTO EOSINOPHILS #: 0.41 K/UL
AUTO EOSINOPHILS %: 7.8 %
AUTO IMMATURE GRANULOCYTES #: 0.02 K/UL
AUTO LYMPHOCYTES #: 1.06 K/UL
AUTO LYMPHOCYTES %: 20.1 %
AUTO MONOCYTES #: 0.4 K/UL
AUTO MONOCYTES %: 7.6 %
AUTO NEUTROPHILS #: 3.29 K/UL
AUTO NEUTROPHILS %: 62.4 %
AUTO NRBC #: 0 K/UL
HCT VFR BLD CALC: 36.3 %
HGB BLD-MCNC: 12.3 G/DL
IMM GRANULOCYTES NFR BLD AUTO: 0.4 %
MAN DIFF?: NORMAL
MCHC RBC-ENTMCNC: 30.6 PG
MCHC RBC-ENTMCNC: 33.9 G/DL
MCV RBC AUTO: 90.3 FL
PLATELET # BLD AUTO: 175 K/UL
PMV BLD AUTO: 0 /100 WBCS
PMV BLD: 9.5 FL
RBC # BLD: 4.02 M/UL
RBC # FLD: 12.3 %
WBC # FLD AUTO: 5.27 K/UL

## 2025-09-16 ENCOUNTER — APPOINTMENT (OUTPATIENT)
Age: 80
End: 2025-09-16
Payer: MEDICARE

## 2025-09-16 VITALS
HEIGHT: 64 IN | TEMPERATURE: 97.8 F | OXYGEN SATURATION: 97 % | RESPIRATION RATE: 16 BRPM | WEIGHT: 156 LBS | DIASTOLIC BLOOD PRESSURE: 73 MMHG | HEART RATE: 76 BPM | SYSTOLIC BLOOD PRESSURE: 119 MMHG | BODY MASS INDEX: 26.63 KG/M2

## 2025-09-16 DIAGNOSIS — D69.6 THROMBOCYTOPENIA, UNSPECIFIED: ICD-10-CM

## 2025-09-16 DIAGNOSIS — Z51.81 ENCOUNTER FOR THERAPEUTIC DRUG LVL MONITORING: ICD-10-CM

## 2025-09-16 LAB
ALBUMIN SERPL ELPH-MCNC: 4.3 G/DL
ALP BLD-CCNC: 102 U/L
ALT SERPL-CCNC: 12 U/L
ANION GAP SERPL CALC-SCNC: 10 MMOL/L
AST SERPL-CCNC: 21 U/L
AUTO BASOPHILS #: 0.1 K/UL
AUTO BASOPHILS %: 1.7 %
AUTO EOSINOPHILS #: 0.61 K/UL
AUTO EOSINOPHILS %: 10.1 %
AUTO IMMATURE GRANULOCYTES #: 0.02 K/UL
AUTO LYMPHOCYTES #: 1.44 K/UL
AUTO LYMPHOCYTES %: 24 %
AUTO MONOCYTES #: 0.48 K/UL
AUTO MONOCYTES %: 8 %
AUTO NEUTROPHILS #: 3.36 K/UL
AUTO NEUTROPHILS %: 55.9 %
AUTO NRBC #: 0 K/UL
BILIRUB SERPL-MCNC: 0.7 MG/DL
BUN SERPL-MCNC: 24 MG/DL
CALCIUM SERPL-MCNC: 9.3 MG/DL
CHLORIDE SERPL-SCNC: 109 MMOL/L
CO2 SERPL-SCNC: 23 MMOL/L
CREAT SERPL-MCNC: 0.9 MG/DL
EGFRCR SERPLBLD CKD-EPI 2021: 66 ML/MIN/1.73M2
GLUCOSE SERPL-MCNC: 84 MG/DL
HCT VFR BLD CALC: 37.8 %
HGB BLD-MCNC: 12.7 G/DL
IMM GRANULOCYTES NFR BLD AUTO: 0.3 %
MAN DIFF?: NORMAL
MCHC RBC-ENTMCNC: 30.5 PG
MCHC RBC-ENTMCNC: 33.6 G/DL
MCV RBC AUTO: 90.6 FL
PLATELET # BLD AUTO: 163 K/UL
PMV BLD AUTO: 0 /100 WBCS
PMV BLD: 9.4 FL
POTASSIUM SERPL-SCNC: 4.6 MMOL/L
PROT SERPL-MCNC: 6.1 G/DL
RBC # BLD: 4.17 M/UL
RBC # FLD: 12.4 %
SODIUM SERPL-SCNC: 142 MMOL/L
WBC # FLD AUTO: 6.01 K/UL

## 2025-09-16 PROCEDURE — 99214 OFFICE O/P EST MOD 30 MIN: CPT

## 2025-09-16 RX ORDER — MELATONIN 5 MG
5 CAPSULE ORAL
Qty: 30 | Refills: 0 | Status: ACTIVE | COMMUNITY
Start: 2025-09-16 | End: 1900-01-01

## 2025-09-16 RX ORDER — ELTROMBOPAG 12.5 MG/1
12.5 TABLET, FILM COATED ORAL DAILY
Qty: 30 | Refills: 1 | Status: ACTIVE | COMMUNITY
Start: 2025-09-16 | End: 1900-01-01

## 2025-09-18 ENCOUNTER — APPOINTMENT (OUTPATIENT)
Facility: CLINIC | Age: 80
End: 2025-09-18
Payer: MEDICARE

## 2025-09-18 DIAGNOSIS — M17.11 UNILATERAL PRIMARY OSTEOARTHRITIS, RIGHT KNEE: ICD-10-CM

## 2025-09-18 PROCEDURE — 20610 DRAIN/INJ JOINT/BURSA W/O US: CPT | Mod: RT

## 2025-09-18 PROCEDURE — 99213 OFFICE O/P EST LOW 20 MIN: CPT | Mod: 25
